# Patient Record
Sex: FEMALE | Race: BLACK OR AFRICAN AMERICAN | NOT HISPANIC OR LATINO
[De-identification: names, ages, dates, MRNs, and addresses within clinical notes are randomized per-mention and may not be internally consistent; named-entity substitution may affect disease eponyms.]

---

## 2020-03-10 PROBLEM — Z00.00 ENCOUNTER FOR PREVENTIVE HEALTH EXAMINATION: Status: ACTIVE | Noted: 2020-03-10

## 2020-05-11 ENCOUNTER — ASOB RESULT (OUTPATIENT)
Age: 30
End: 2020-05-11

## 2020-05-11 ENCOUNTER — APPOINTMENT (OUTPATIENT)
Dept: OBGYN | Facility: CLINIC | Age: 30
End: 2020-05-11
Payer: COMMERCIAL

## 2020-05-11 VITALS
TEMPERATURE: 98.2 F | BODY MASS INDEX: 35.5 KG/M2 | WEIGHT: 188 LBS | DIASTOLIC BLOOD PRESSURE: 86 MMHG | HEIGHT: 61 IN | SYSTOLIC BLOOD PRESSURE: 144 MMHG | HEART RATE: 72 BPM

## 2020-05-11 DIAGNOSIS — Z78.9 OTHER SPECIFIED HEALTH STATUS: ICD-10-CM

## 2020-05-11 DIAGNOSIS — Z86.19 PERSONAL HISTORY OF OTHER INFECTIOUS AND PARASITIC DISEASES: ICD-10-CM

## 2020-05-11 DIAGNOSIS — Z83.3 FAMILY HISTORY OF DIABETES MELLITUS: ICD-10-CM

## 2020-05-11 DIAGNOSIS — Z86.39 PERSONAL HISTORY OF OTHER ENDOCRINE, NUTRITIONAL AND METABOLIC DISEASE: ICD-10-CM

## 2020-05-11 DIAGNOSIS — Z82.49 FAMILY HISTORY OF ISCHEMIC HEART DISEASE AND OTHER DISEASES OF THE CIRCULATORY SYSTEM: ICD-10-CM

## 2020-05-11 PROCEDURE — 76817 TRANSVAGINAL US OBSTETRIC: CPT

## 2020-05-11 PROCEDURE — 99214 OFFICE O/P EST MOD 30 MIN: CPT

## 2020-05-11 PROCEDURE — 76801 OB US < 14 WKS SINGLE FETUS: CPT

## 2020-05-11 PROCEDURE — 81025 URINE PREGNANCY TEST: CPT

## 2020-05-12 LAB
BASOPHILS # BLD AUTO: 0.04 K/UL
BASOPHILS NFR BLD AUTO: 0.5 %
EOSINOPHIL # BLD AUTO: 0.26 K/UL
EOSINOPHIL NFR BLD AUTO: 3.6 %
HCG SERPL-MCNC: 7088 MIU/ML
HCG UR QL: POSITIVE
HCT VFR BLD CALC: 36.3 %
HGB BLD-MCNC: 11.9 G/DL
IMM GRANULOCYTES NFR BLD AUTO: 0.1 %
LYMPHOCYTES # BLD AUTO: 2.27 K/UL
LYMPHOCYTES NFR BLD AUTO: 31.1 %
MAN DIFF?: NORMAL
MCHC RBC-ENTMCNC: 29.2 PG
MCHC RBC-ENTMCNC: 32.8 G/DL
MCV RBC AUTO: 89 FL
MONOCYTES # BLD AUTO: 0.52 K/UL
MONOCYTES NFR BLD AUTO: 7.1 %
NEUTROPHILS # BLD AUTO: 4.2 K/UL
NEUTROPHILS NFR BLD AUTO: 57.6 %
PLATELET # BLD AUTO: 304 K/UL
QUALITY CONTROL: YES
RBC # BLD: 4.08 M/UL
RBC # FLD: 12.9 %
WBC # FLD AUTO: 7.3 K/UL

## 2020-05-13 ENCOUNTER — APPOINTMENT (OUTPATIENT)
Dept: ANTEPARTUM | Facility: CLINIC | Age: 30
End: 2020-05-13
Payer: COMMERCIAL

## 2020-05-13 ENCOUNTER — APPOINTMENT (OUTPATIENT)
Dept: OBGYN | Facility: CLINIC | Age: 30
End: 2020-05-13
Payer: COMMERCIAL

## 2020-05-13 ENCOUNTER — TRANSCRIPTION ENCOUNTER (OUTPATIENT)
Age: 30
End: 2020-05-13

## 2020-05-13 ENCOUNTER — ASOB RESULT (OUTPATIENT)
Age: 30
End: 2020-05-13

## 2020-05-13 ENCOUNTER — OUTPATIENT (OUTPATIENT)
Dept: OUTPATIENT SERVICES | Facility: HOSPITAL | Age: 30
LOS: 1 days | Discharge: HOME | End: 2020-05-13

## 2020-05-13 PROBLEM — Z83.3 FAMILY HISTORY OF DIABETES MELLITUS: Status: ACTIVE | Noted: 2020-05-11

## 2020-05-13 PROBLEM — Z78.9 DOES NOT USE ILLICIT DRUGS: Status: ACTIVE | Noted: 2020-05-13

## 2020-05-13 PROBLEM — Z86.39 HISTORY OF OBESITY: Status: RESOLVED | Noted: 2020-05-13 | Resolved: 2020-05-13

## 2020-05-13 PROBLEM — Z82.49 FAMILY HISTORY OF HYPERTENSION: Status: ACTIVE | Noted: 2020-05-11

## 2020-05-13 PROBLEM — Z78.9 CONSUMES ALCOHOL OCCASIONALLY: Status: ACTIVE | Noted: 2020-05-13

## 2020-05-13 PROBLEM — Z86.19 HISTORY OF HERPES SIMPLEX INFECTION: Status: RESOLVED | Noted: 2020-05-13 | Resolved: 2020-05-13

## 2020-05-13 PROCEDURE — 99442: CPT

## 2020-05-13 PROCEDURE — 76817 TRANSVAGINAL US OBSTETRIC: CPT | Mod: 26

## 2020-05-13 NOTE — COUNSELING
[Nutrition] : nutrition [Vitamins/Supplements] : vitamins/supplements [Pregnancy Options] : pregnancy options [Safe Sexual Practices] : safe sexual practices

## 2020-05-13 NOTE — HISTORY OF PRESENT ILLNESS
[Good] : being in good health [Regular Cycle Intervals] : periods have been regular [Menarche Age: ____] : age at menarche was [unfilled] [Sexually Active] : is sexually active [Monogamous] : is monogamous [Contraception] : uses contraception [Condoms] : uses condoms [Male ___] : [unfilled] male

## 2020-05-13 NOTE — PHYSICAL EXAM
[No Lesions] : no genitalia lesions [Normal] : uterus [Labia Majora] : labia major [No Bleeding] : there was no active vaginal bleeding [Discharge] : had no discharge [Dilated] : the cervix was not dilated [Motion Tenderness] : there was no cervical motion tenderness [Tenderness] : nontender [Enlarged ___ wks] : not enlarged

## 2020-05-15 DIAGNOSIS — Z3A.01 LESS THAN 8 WEEKS GESTATION OF PREGNANCY: ICD-10-CM

## 2020-05-15 DIAGNOSIS — N83.209 UNSPECIFIED OVARIAN CYST, UNSPECIFIED SIDE: ICD-10-CM

## 2020-05-15 DIAGNOSIS — O36.80X0 PREGNANCY WITH INCONCLUSIVE FETAL VIABILITY, NOT APPLICABLE OR UNSPECIFIED: ICD-10-CM

## 2020-05-18 LAB
BASOPHILS # BLD AUTO: 0.05 K/UL
BASOPHILS NFR BLD AUTO: 0.6 %
EOSINOPHIL # BLD AUTO: 0.28 K/UL
EOSINOPHIL NFR BLD AUTO: 3.3 %
HCG SERPL-MCNC: ABNORMAL MIU/ML
HCT VFR BLD CALC: 37.6 %
HGB BLD-MCNC: 12.2 G/DL
IMM GRANULOCYTES NFR BLD AUTO: 0.2 %
LYMPHOCYTES # BLD AUTO: 2.76 K/UL
LYMPHOCYTES NFR BLD AUTO: 32.8 %
MAN DIFF?: NORMAL
MCHC RBC-ENTMCNC: 29.1 PG
MCHC RBC-ENTMCNC: 32.4 G/DL
MCV RBC AUTO: 89.7 FL
MONOCYTES # BLD AUTO: 0.57 K/UL
MONOCYTES NFR BLD AUTO: 6.8 %
NEUTROPHILS # BLD AUTO: 4.74 K/UL
NEUTROPHILS NFR BLD AUTO: 56.3 %
PLATELET # BLD AUTO: 303 K/UL
RBC # BLD: 4.19 M/UL
RBC # FLD: 12.7 %
WBC # FLD AUTO: 8.42 K/UL

## 2020-05-22 RX ORDER — METOCLOPRAMIDE 10 MG/1
10 TABLET ORAL 3 TIMES DAILY
Qty: 90 | Refills: 1 | Status: ACTIVE | COMMUNITY
Start: 2020-05-22 | End: 1900-01-01

## 2020-06-08 ENCOUNTER — APPOINTMENT (OUTPATIENT)
Dept: OBGYN | Facility: CLINIC | Age: 30
End: 2020-06-08
Payer: COMMERCIAL

## 2020-06-08 VITALS
SYSTOLIC BLOOD PRESSURE: 118 MMHG | DIASTOLIC BLOOD PRESSURE: 78 MMHG | TEMPERATURE: 97.2 F | HEIGHT: 61 IN | WEIGHT: 194 LBS | BODY MASS INDEX: 36.63 KG/M2 | HEART RATE: 69 BPM

## 2020-06-08 DIAGNOSIS — Z33.1 PREGNANT STATE, INCIDENTAL: ICD-10-CM

## 2020-06-08 DIAGNOSIS — Z34.90 ENCOUNTER FOR SUPERVISION OF NORMAL PREGNANCY, UNSPECIFIED, UNSPECIFIED TRIMESTER: ICD-10-CM

## 2020-06-08 DIAGNOSIS — Z87.42 PERSONAL HISTORY OF OTHER DISEASES OF THE FEMALE GENITAL TRACT: ICD-10-CM

## 2020-06-08 DIAGNOSIS — Z3A.01 LESS THAN 8 WEEKS GESTATION OF PREGNANCY: ICD-10-CM

## 2020-06-08 LAB
ABO + RH PNL BLD: NORMAL
ANION GAP SERPL CALC-SCNC: 15 MMOL/L
BASOPHILS # BLD AUTO: 0.02 K/UL
BASOPHILS NFR BLD AUTO: 0.2 %
BILIRUB UR QL STRIP: NORMAL
BLD GP AB SCN SERPL QL: NORMAL
BUN SERPL-MCNC: 8 MG/DL
CALCIUM SERPL-MCNC: 9.6 MG/DL
CHLORIDE SERPL-SCNC: 99 MMOL/L
CLARITY UR: CLEAR
CO2 SERPL-SCNC: 23 MMOL/L
COLLECTION METHOD: NORMAL
CREAT SERPL-MCNC: 0.5 MG/DL
EOSINOPHIL # BLD AUTO: 0.12 K/UL
EOSINOPHIL NFR BLD AUTO: 1.4 %
ESTIMATED AVERAGE GLUCOSE: 103 MG/DL
GLUCOSE SERPL-MCNC: 77 MG/DL
GLUCOSE UR-MCNC: NORMAL
HBA1C MFR BLD HPLC: 5.2 %
HCG SERPL-MCNC: ABNORMAL MIU/ML
HCG UR QL: 0.2 EU/DL
HCT VFR BLD CALC: 37.3 %
HGB BLD-MCNC: 12 G/DL
HGB UR QL STRIP.AUTO: NORMAL
IMM GRANULOCYTES NFR BLD AUTO: 0.5 %
KETONES UR-MCNC: NORMAL
LEUKOCYTE ESTERASE UR QL STRIP: NORMAL
LYMPHOCYTES # BLD AUTO: 2.12 K/UL
LYMPHOCYTES NFR BLD AUTO: 24 %
MAN DIFF?: NORMAL
MCHC RBC-ENTMCNC: 28.4 PG
MCHC RBC-ENTMCNC: 32.2 G/DL
MCV RBC AUTO: 88.4 FL
MONOCYTES # BLD AUTO: 0.57 K/UL
MONOCYTES NFR BLD AUTO: 6.4 %
NEUTROPHILS # BLD AUTO: 5.97 K/UL
NEUTROPHILS NFR BLD AUTO: 67.5 %
NITRITE UR QL STRIP: NORMAL
PH UR STRIP: 7
PLATELET # BLD AUTO: 303 K/UL
POTASSIUM SERPL-SCNC: 4.3 MMOL/L
PROT UR STRIP-MCNC: NORMAL
RBC # BLD: 4.22 M/UL
RBC # FLD: 12.6 %
SODIUM SERPL-SCNC: 137 MMOL/L
SP GR UR STRIP: 1.02
WBC # FLD AUTO: 8.84 K/UL

## 2020-06-08 PROCEDURE — 81003 URINALYSIS AUTO W/O SCOPE: CPT | Mod: QW

## 2020-06-08 PROCEDURE — 0500F INITIAL PRENATAL CARE VISIT: CPT

## 2020-06-08 PROCEDURE — 76815 OB US LIMITED FETUS(S): CPT

## 2020-06-09 LAB
25(OH)D3 SERPL-MCNC: 35 NG/ML
CMV IGG SERPL QL: <0.2 U/ML
CMV IGG SERPL-IMP: NEGATIVE
CMV IGM SERPL QL: <8 AU/ML
CMV IGM SERPL QL: NEGATIVE
HBV SURFACE AG SER QL: NONREACTIVE
HCV AB SER QL: NONREACTIVE
HCV S/CO RATIO: 0.09 S/CO
HIV1+2 AB SPEC QL IA.RAPID: NONREACTIVE
HSV 1+2 IGG SER IA-IMP: NEGATIVE
HSV 1+2 IGG SER IA-IMP: POSITIVE
HSV1 IGG SER QL: 1.93 INDEX
HSV2 IGG SER QL: 0.27 INDEX
MEV IGG FLD QL IA: 32.4 AU/ML
MEV IGG+IGM SER-IMP: POSITIVE
MUV AB SER-ACNC: NEGATIVE
MUV IGG SER QL IA: 8 AU/ML
RUBV IGG FLD-ACNC: 2.3 INDEX
RUBV IGG SER-IMP: POSITIVE
T GONDII AB SER-IMP: NEGATIVE
T GONDII AB SER-IMP: NEGATIVE
T GONDII IGG SER QL: <3 IU/ML
T GONDII IGM SER QL: <3 AU/ML
T PALLIDUM AB SER QL IA: NEGATIVE
TSH SERPL-ACNC: 0.71 UIU/ML
VZV AB TITR SER: POSITIVE
VZV IGG SER IF-ACNC: 551.9 INDEX

## 2020-06-10 ENCOUNTER — NON-APPOINTMENT (OUTPATIENT)
Age: 30
End: 2020-06-10

## 2020-06-10 PROBLEM — Z87.42 HISTORY OF AMENORRHEA: Status: RESOLVED | Noted: 2020-05-11 | Resolved: 2020-06-10

## 2020-06-10 PROBLEM — Z33.1 INCIDENTAL PREGNANCY: Status: RESOLVED | Noted: 2020-05-11 | Resolved: 2020-06-10

## 2020-06-10 PROBLEM — Z34.90 UNPLANNED PREGNANCY, UNKNOWN IF WANTED: Status: RESOLVED | Noted: 2020-05-13 | Resolved: 2020-06-10

## 2020-06-10 LAB
HSV1 IGM SER QL: NORMAL TITER
HSV2 AB FLD-ACNC: NORMAL TITER
LEAD BLD-MCNC: <1 UG/DL
M TB IFN-G BLD-IMP: NEGATIVE
QUANTIFERON TB PLUS MITOGEN MINUS NIL: 7.65 IU/ML
QUANTIFERON TB PLUS NIL: 0.01 IU/ML
QUANTIFERON TB PLUS TB1 MINUS NIL: 0 IU/ML
QUANTIFERON TB PLUS TB2 MINUS NIL: 0 IU/ML

## 2020-06-11 LAB
HGB A MFR BLD: 97.4 %
HGB A2 MFR BLD: 2.6 %
HGB FRACT BLD-IMP: NORMAL

## 2020-06-15 LAB
A VAGINAE DNA VAG QL NAA+PROBE: NORMAL
BVAB2 DNA VAG QL NAA+PROBE: NORMAL
C KRUSEI DNA VAG QL NAA+PROBE: NEGATIVE
C TRACH RRNA SPEC QL NAA+PROBE: NEGATIVE
FMR1 GENE MUT ANL BLD/T: NORMAL
MEGA1 DNA VAG QL NAA+PROBE: NORMAL
N GONORRHOEA RRNA SPEC QL NAA+PROBE: NEGATIVE
T VAGINALIS RRNA SPEC QL NAA+PROBE: NEGATIVE

## 2020-06-18 LAB
AR GENE MUT ANL BLD/T: NEGATIVE
CFTR MUT TESTED BLD/T: NEGATIVE

## 2020-06-29 ENCOUNTER — APPOINTMENT (OUTPATIENT)
Dept: OBGYN | Facility: CLINIC | Age: 30
End: 2020-06-29
Payer: COMMERCIAL

## 2020-06-29 ENCOUNTER — OUTPATIENT (OUTPATIENT)
Dept: OUTPATIENT SERVICES | Facility: HOSPITAL | Age: 30
LOS: 1 days | Discharge: HOME | End: 2020-06-29

## 2020-06-29 ENCOUNTER — NON-APPOINTMENT (OUTPATIENT)
Age: 30
End: 2020-06-29

## 2020-06-29 ENCOUNTER — APPOINTMENT (OUTPATIENT)
Dept: ANTEPARTUM | Facility: CLINIC | Age: 30
End: 2020-06-29
Payer: COMMERCIAL

## 2020-06-29 ENCOUNTER — ASOB RESULT (OUTPATIENT)
Age: 30
End: 2020-06-29

## 2020-06-29 VITALS
DIASTOLIC BLOOD PRESSURE: 82 MMHG | HEIGHT: 61 IN | TEMPERATURE: 97.4 F | WEIGHT: 195 LBS | BODY MASS INDEX: 36.82 KG/M2 | SYSTOLIC BLOOD PRESSURE: 117 MMHG | HEART RATE: 76 BPM

## 2020-06-29 DIAGNOSIS — Z3A.09 9 WEEKS GESTATION OF PREGNANCY: ICD-10-CM

## 2020-06-29 LAB
BILIRUB UR QL STRIP: NEGATIVE
CLARITY UR: CLEAR
COLLECTION METHOD: NORMAL
GLUCOSE UR-MCNC: NEGATIVE
HCG UR QL: 0.2 EU/DL
HGB UR QL STRIP.AUTO: NEGATIVE
KETONES UR-MCNC: NEGATIVE
LEUKOCYTE ESTERASE UR QL STRIP: NEGATIVE
NITRITE UR QL STRIP: NEGATIVE
PH UR STRIP: 5.5
PROT UR STRIP-MCNC: NEGATIVE
SP GR UR STRIP: 1.02

## 2020-06-29 PROCEDURE — 81003 URINALYSIS AUTO W/O SCOPE: CPT | Mod: QW

## 2020-06-29 PROCEDURE — 0502F SUBSEQUENT PRENATAL CARE: CPT

## 2020-06-29 PROCEDURE — 76813 OB US NUCHAL MEAS 1 GEST: CPT | Mod: 26

## 2020-06-29 RX ORDER — ACYCLOVIR 50 MG/G
5 OINTMENT TOPICAL
Qty: 30 | Refills: 0 | Status: ACTIVE | COMMUNITY
Start: 2020-01-21

## 2020-06-29 RX ORDER — DOXYCYCLINE HYCLATE 100 MG/1
100 TABLET ORAL
Qty: 8 | Refills: 0 | Status: DISCONTINUED | COMMUNITY
Start: 2020-02-22

## 2020-06-29 RX ORDER — METHYLERGONOVINE MALEATE 0.2 MG/1
0.2 TABLET ORAL
Qty: 6 | Refills: 0 | Status: COMPLETED | COMMUNITY
Start: 2020-02-22

## 2020-06-29 RX ORDER — VALACYCLOVIR 500 MG/1
500 TABLET, FILM COATED ORAL
Qty: 10 | Refills: 0 | Status: ACTIVE | COMMUNITY
Start: 2019-03-19

## 2020-08-03 ENCOUNTER — APPOINTMENT (OUTPATIENT)
Dept: OBGYN | Facility: CLINIC | Age: 30
End: 2020-08-03
Payer: COMMERCIAL

## 2020-08-03 ENCOUNTER — NON-APPOINTMENT (OUTPATIENT)
Age: 30
End: 2020-08-03

## 2020-08-03 VITALS
SYSTOLIC BLOOD PRESSURE: 109 MMHG | HEIGHT: 61 IN | BODY MASS INDEX: 38.71 KG/M2 | DIASTOLIC BLOOD PRESSURE: 65 MMHG | TEMPERATURE: 97.7 F | HEART RATE: 87 BPM | WEIGHT: 205 LBS

## 2020-08-03 DIAGNOSIS — Z3A.12 12 WEEKS GESTATION OF PREGNANCY: ICD-10-CM

## 2020-08-03 DIAGNOSIS — O21.9 VOMITING OF PREGNANCY, UNSPECIFIED: ICD-10-CM

## 2020-08-03 LAB
BILIRUB UR QL STRIP: NORMAL
CLARITY UR: CLEAR
COLLECTION METHOD: NORMAL
GLUCOSE UR-MCNC: NORMAL
HCG UR QL: 0.2 EU/DL
HGB UR QL STRIP.AUTO: NORMAL
KETONES UR-MCNC: NORMAL
LEUKOCYTE ESTERASE UR QL STRIP: NORMAL
NITRITE UR QL STRIP: NORMAL
PH UR STRIP: 7
PROT UR STRIP-MCNC: NORMAL
SP GR UR STRIP: 1.01

## 2020-08-03 PROCEDURE — 0502F SUBSEQUENT PRENATAL CARE: CPT

## 2020-08-03 PROCEDURE — 81003 URINALYSIS AUTO W/O SCOPE: CPT | Mod: QW

## 2020-08-09 LAB
2ND TRIMESTER DATA: NORMAL
AFP PNL SERPL: NORMAL
AFP SERPL-ACNC: NORMAL
CLINICAL BIOCHEMIST REVIEW: NORMAL
NOTES NTD: NORMAL

## 2020-08-28 ENCOUNTER — ASOB RESULT (OUTPATIENT)
Age: 30
End: 2020-08-28

## 2020-08-28 ENCOUNTER — APPOINTMENT (OUTPATIENT)
Dept: OBGYN | Facility: CLINIC | Age: 30
End: 2020-08-28
Payer: COMMERCIAL

## 2020-08-28 ENCOUNTER — NON-APPOINTMENT (OUTPATIENT)
Age: 30
End: 2020-08-28

## 2020-08-28 ENCOUNTER — APPOINTMENT (OUTPATIENT)
Dept: ANTEPARTUM | Facility: CLINIC | Age: 30
End: 2020-08-28
Payer: COMMERCIAL

## 2020-08-28 ENCOUNTER — OUTPATIENT (OUTPATIENT)
Dept: OUTPATIENT SERVICES | Facility: HOSPITAL | Age: 30
LOS: 1 days | Discharge: HOME | End: 2020-08-28

## 2020-08-28 VITALS
HEIGHT: 61 IN | SYSTOLIC BLOOD PRESSURE: 121 MMHG | WEIGHT: 211 LBS | BODY MASS INDEX: 39.84 KG/M2 | TEMPERATURE: 97.3 F | HEART RATE: 89 BPM | DIASTOLIC BLOOD PRESSURE: 84 MMHG

## 2020-08-28 DIAGNOSIS — Z87.59 PERSONAL HISTORY OF OTHER COMPLICATIONS OF PREGNANCY, CHILDBIRTH AND THE PUERPERIUM: ICD-10-CM

## 2020-08-28 DIAGNOSIS — E66.9 OBESITY, UNSPECIFIED: ICD-10-CM

## 2020-08-28 DIAGNOSIS — Z31.5 ENCOUNTER FOR PROCREATIVE GENETIC COUNSELING: ICD-10-CM

## 2020-08-28 DIAGNOSIS — Z3A.17 17 WEEKS GESTATION OF PREGNANCY: ICD-10-CM

## 2020-08-28 LAB
BILIRUB UR QL STRIP: NORMAL
CLARITY UR: CLEAR
COLLECTION METHOD: NORMAL
GLUCOSE UR-MCNC: NORMAL
HCG UR QL: 0.2 EU/DL
HGB UR QL STRIP.AUTO: NORMAL
KETONES UR-MCNC: NORMAL
LEUKOCYTE ESTERASE UR QL STRIP: ABNORMAL
NITRITE UR QL STRIP: NORMAL
PH UR STRIP: 7
PROT UR STRIP-MCNC: NORMAL
SP GR UR STRIP: 1.02

## 2020-08-28 PROCEDURE — 76817 TRANSVAGINAL US OBSTETRIC: CPT | Mod: 26,59

## 2020-08-28 PROCEDURE — 81003 URINALYSIS AUTO W/O SCOPE: CPT | Mod: QW

## 2020-08-28 PROCEDURE — 0502F SUBSEQUENT PRENATAL CARE: CPT

## 2020-08-28 PROCEDURE — 76805 OB US >/= 14 WKS SNGL FETUS: CPT | Mod: 26

## 2020-08-28 RX ORDER — TERCONAZOLE 8 MG/G
0.8 CREAM VAGINAL
Qty: 1 | Refills: 1 | Status: ACTIVE | COMMUNITY
Start: 2020-08-28 | End: 1900-01-01

## 2020-08-28 RX ORDER — VALACYCLOVIR 500 MG/1
500 TABLET, FILM COATED ORAL
Qty: 14 | Refills: 5 | Status: ACTIVE | COMMUNITY
Start: 2020-08-28 | End: 1900-01-01

## 2020-08-31 DIAGNOSIS — O35.9XX0 MATERNAL CARE FOR (SUSPECTED) FETAL ABNORMALITY AND DAMAGE, UNSPECIFIED, NOT APPLICABLE OR UNSPECIFIED: ICD-10-CM

## 2020-08-31 DIAGNOSIS — Z3A.20 20 WEEKS GESTATION OF PREGNANCY: ICD-10-CM

## 2020-08-31 DIAGNOSIS — Z36.3 ENCOUNTER FOR ANTENATAL SCREENING FOR MALFORMATIONS: ICD-10-CM

## 2020-08-31 DIAGNOSIS — Z36.89 ENCOUNTER FOR OTHER SPECIFIED ANTENATAL SCREENING: ICD-10-CM

## 2020-08-31 DIAGNOSIS — O43.199 OTHER MALFORMATION OF PLACENTA, UNSPECIFIED TRIMESTER: ICD-10-CM

## 2020-08-31 DIAGNOSIS — O99.210 OBESITY COMPLICATING PREGNANCY, UNSPECIFIED TRIMESTER: ICD-10-CM

## 2020-09-25 ENCOUNTER — NON-APPOINTMENT (OUTPATIENT)
Age: 30
End: 2020-09-25

## 2020-09-25 ENCOUNTER — APPOINTMENT (OUTPATIENT)
Dept: OBGYN | Facility: CLINIC | Age: 30
End: 2020-09-25
Payer: COMMERCIAL

## 2020-09-25 VITALS
BODY MASS INDEX: 39.84 KG/M2 | HEIGHT: 61 IN | HEART RATE: 91 BPM | SYSTOLIC BLOOD PRESSURE: 125 MMHG | TEMPERATURE: 97 F | WEIGHT: 211 LBS | DIASTOLIC BLOOD PRESSURE: 82 MMHG

## 2020-09-25 DIAGNOSIS — Z3A.20 20 WEEKS GESTATION OF PREGNANCY: ICD-10-CM

## 2020-09-25 DIAGNOSIS — Z86.19 PERSONAL HISTORY OF OTHER INFECTIOUS AND PARASITIC DISEASES: ICD-10-CM

## 2020-09-25 PROCEDURE — 90686 IIV4 VACC NO PRSV 0.5 ML IM: CPT

## 2020-09-25 PROCEDURE — 81003 URINALYSIS AUTO W/O SCOPE: CPT | Mod: QW

## 2020-09-25 PROCEDURE — G0008: CPT

## 2020-09-25 PROCEDURE — 0502F SUBSEQUENT PRENATAL CARE: CPT

## 2020-09-28 LAB
BASOPHILS # BLD AUTO: 0.03 K/UL
BASOPHILS NFR BLD AUTO: 0.3 %
EOSINOPHIL # BLD AUTO: 0.13 K/UL
EOSINOPHIL NFR BLD AUTO: 1.4 %
GLUCOSE 1H P 50 G GLC PO SERPL-MCNC: 161 MG/DL
HCT VFR BLD CALC: 30.4 %
HGB BLD-MCNC: 9.9 G/DL
IMM GRANULOCYTES NFR BLD AUTO: 1.1 %
LYMPHOCYTES # BLD AUTO: 1.57 K/UL
LYMPHOCYTES NFR BLD AUTO: 17 %
MAN DIFF?: NORMAL
MCHC RBC-ENTMCNC: 28.9 PG
MCHC RBC-ENTMCNC: 32.6 G/DL
MCV RBC AUTO: 88.9 FL
MONOCYTES # BLD AUTO: 0.42 K/UL
MONOCYTES NFR BLD AUTO: 4.6 %
NEUTROPHILS # BLD AUTO: 6.98 K/UL
NEUTROPHILS NFR BLD AUTO: 75.6 %
PLATELET # BLD AUTO: 229 K/UL
RBC # BLD: 3.42 M/UL
RBC # FLD: 13.2 %
WBC # FLD AUTO: 9.23 K/UL

## 2020-09-28 RX ORDER — CHLORHEXIDINE GLUCONATE 4 %
325 (65 FE) LIQUID (ML) TOPICAL 3 TIMES DAILY
Qty: 90 | Refills: 1 | Status: ACTIVE | COMMUNITY
Start: 2020-09-28 | End: 1900-01-01

## 2020-10-05 LAB
GLUCOSE 1H P 100 G GLC PO SERPL-MCNC: 174 MG/DL
GLUCOSE 2H P CHAL SERPL-MCNC: 158 MG/DL
GLUCOSE 3H P CHAL SERPL-MCNC: 110 MG/DL
GLUCOSE BS SERPL-MCNC: 78 MG/DL

## 2020-10-23 ENCOUNTER — APPOINTMENT (OUTPATIENT)
Dept: OBGYN | Facility: CLINIC | Age: 30
End: 2020-10-23

## 2020-10-30 ENCOUNTER — APPOINTMENT (OUTPATIENT)
Dept: OBGYN | Facility: CLINIC | Age: 30
End: 2020-10-30
Payer: COMMERCIAL

## 2020-10-30 VITALS
TEMPERATURE: 97.2 F | WEIGHT: 219 LBS | HEIGHT: 61 IN | SYSTOLIC BLOOD PRESSURE: 121 MMHG | DIASTOLIC BLOOD PRESSURE: 75 MMHG | BODY MASS INDEX: 41.35 KG/M2 | HEART RATE: 89 BPM

## 2020-10-30 DIAGNOSIS — Z3A.24 24 WEEKS GESTATION OF PREGNANCY: ICD-10-CM

## 2020-10-30 DIAGNOSIS — O99.810 ABNORMAL GLUCOSE COMPLICATING PREGNANCY: ICD-10-CM

## 2020-10-30 PROCEDURE — 0502F SUBSEQUENT PRENATAL CARE: CPT

## 2020-10-30 PROCEDURE — 81003 URINALYSIS AUTO W/O SCOPE: CPT | Mod: QW

## 2020-10-30 PROCEDURE — 90471 IMMUNIZATION ADMIN: CPT

## 2020-10-30 PROCEDURE — 90715 TDAP VACCINE 7 YRS/> IM: CPT

## 2020-10-30 RX ORDER — METRONIDAZOLE 7.5 MG/G
0.75 GEL VAGINAL
Qty: 1 | Refills: 1 | Status: ACTIVE | COMMUNITY
Start: 2020-10-30 | End: 1900-01-01

## 2020-10-31 LAB
BILIRUB UR QL STRIP: NORMAL
CLARITY UR: CLEAR
COLLECTION METHOD: NORMAL
GLUCOSE UR-MCNC: NORMAL
HCG UR QL: 0.2 EU/DL
HGB UR QL STRIP.AUTO: NORMAL
KETONES UR-MCNC: NORMAL
LEUKOCYTE ESTERASE UR QL STRIP: ABNORMAL
NITRITE UR QL STRIP: NORMAL
PH UR STRIP: 7
PROT UR STRIP-MCNC: NORMAL
SP GR UR STRIP: 1.01

## 2020-11-04 LAB
A VAGINAE DNA VAG QL NAA+PROBE: NORMAL
BVAB2 DNA VAG QL NAA+PROBE: NORMAL
C KRUSEI DNA VAG QL NAA+PROBE: NEGATIVE
C TRACH RRNA SPEC QL NAA+PROBE: NEGATIVE
MEGA1 DNA VAG QL NAA+PROBE: ABNORMAL
N GONORRHOEA RRNA SPEC QL NAA+PROBE: NEGATIVE
T VAGINALIS RRNA SPEC QL NAA+PROBE: POSITIVE

## 2020-11-04 RX ORDER — METRONIDAZOLE 500 MG/1
500 TABLET ORAL
Qty: 4 | Refills: 0 | Status: ACTIVE | COMMUNITY
Start: 2020-11-04 | End: 1900-01-01

## 2020-11-08 ENCOUNTER — NON-APPOINTMENT (OUTPATIENT)
Age: 30
End: 2020-11-08

## 2020-11-08 PROBLEM — O99.810 PREGNANCY WITH ABNORMAL GLUCOSE TOLERANCE TEST (GTT): Status: RESOLVED | Noted: 2020-09-28 | Resolved: 2020-11-08

## 2020-11-08 PROBLEM — Z3A.24 PREGNANCY WITH 24 COMPLETED WEEKS GESTATION: Status: RESOLVED | Noted: 2020-09-25 | Resolved: 2020-11-08

## 2020-11-09 ENCOUNTER — ASOB RESULT (OUTPATIENT)
Age: 30
End: 2020-11-09

## 2020-11-09 ENCOUNTER — APPOINTMENT (OUTPATIENT)
Dept: ANTEPARTUM | Facility: CLINIC | Age: 30
End: 2020-11-09
Payer: COMMERCIAL

## 2020-11-09 ENCOUNTER — RESULT CHARGE (OUTPATIENT)
Age: 30
End: 2020-11-09

## 2020-11-09 ENCOUNTER — NON-APPOINTMENT (OUTPATIENT)
Age: 30
End: 2020-11-09

## 2020-11-09 ENCOUNTER — APPOINTMENT (OUTPATIENT)
Dept: OBGYN | Facility: CLINIC | Age: 30
End: 2020-11-09
Payer: COMMERCIAL

## 2020-11-09 ENCOUNTER — OUTPATIENT (OUTPATIENT)
Dept: OUTPATIENT SERVICES | Facility: HOSPITAL | Age: 30
LOS: 1 days | Discharge: HOME | End: 2020-11-09

## 2020-11-09 VITALS
HEIGHT: 61 IN | SYSTOLIC BLOOD PRESSURE: 121 MMHG | WEIGHT: 216 LBS | BODY MASS INDEX: 40.78 KG/M2 | HEART RATE: 91 BPM | DIASTOLIC BLOOD PRESSURE: 75 MMHG | TEMPERATURE: 97.5 F

## 2020-11-09 DIAGNOSIS — Z3A.29 29 WEEKS GESTATION OF PREGNANCY: ICD-10-CM

## 2020-11-09 LAB
BILIRUB UR QL STRIP: NEGATIVE
CLARITY UR: NORMAL
COLLECTION METHOD: NORMAL
GLUCOSE UR-MCNC: ABNORMAL
HCG UR QL: 0.2 EU/DL
HGB UR QL STRIP.AUTO: NEGATIVE
KETONES UR-MCNC: NEGATIVE
LEUKOCYTE ESTERASE UR QL STRIP: NORMAL
NITRITE UR QL STRIP: NEGATIVE
PH UR STRIP: 6
PROT UR STRIP-MCNC: NEGATIVE
SP GR UR STRIP: 1.02

## 2020-11-09 PROCEDURE — 81003 URINALYSIS AUTO W/O SCOPE: CPT | Mod: QW

## 2020-11-09 PROCEDURE — 0502F SUBSEQUENT PRENATAL CARE: CPT

## 2020-11-09 PROCEDURE — 76816 OB US FOLLOW-UP PER FETUS: CPT | Mod: 26

## 2020-11-10 DIAGNOSIS — O99.213 OBESITY COMPLICATING PREGNANCY, THIRD TRIMESTER: ICD-10-CM

## 2020-11-10 DIAGNOSIS — Z3A.31 31 WEEKS GESTATION OF PREGNANCY: ICD-10-CM

## 2020-11-17 ENCOUNTER — RX RENEWAL (OUTPATIENT)
Age: 30
End: 2020-11-17

## 2020-11-17 RX ORDER — ASCORBIC ACID 500 MG
500 TABLET ORAL
Qty: 60 | Refills: 0 | Status: ACTIVE | COMMUNITY
Start: 2020-09-28 | End: 1900-01-01

## 2020-11-23 ENCOUNTER — APPOINTMENT (OUTPATIENT)
Dept: OBGYN | Facility: CLINIC | Age: 30
End: 2020-11-23
Payer: COMMERCIAL

## 2020-11-23 ENCOUNTER — OUTPATIENT (OUTPATIENT)
Dept: OUTPATIENT SERVICES | Facility: HOSPITAL | Age: 30
LOS: 1 days | Discharge: HOME | End: 2020-11-23

## 2020-11-23 ENCOUNTER — NON-APPOINTMENT (OUTPATIENT)
Age: 30
End: 2020-11-23

## 2020-11-23 ENCOUNTER — APPOINTMENT (OUTPATIENT)
Dept: ANTEPARTUM | Facility: CLINIC | Age: 30
End: 2020-11-23
Payer: COMMERCIAL

## 2020-11-23 ENCOUNTER — ASOB RESULT (OUTPATIENT)
Age: 30
End: 2020-11-23

## 2020-11-23 VITALS
HEART RATE: 80 BPM | SYSTOLIC BLOOD PRESSURE: 118 MMHG | TEMPERATURE: 98 F | HEIGHT: 61 IN | WEIGHT: 215 LBS | BODY MASS INDEX: 40.59 KG/M2 | DIASTOLIC BLOOD PRESSURE: 69 MMHG

## 2020-11-23 DIAGNOSIS — Z3A.31 31 WEEKS GESTATION OF PREGNANCY: ICD-10-CM

## 2020-11-23 LAB
BILIRUB UR QL STRIP: NEGATIVE
CLARITY UR: CLEAR
COLLECTION METHOD: NORMAL
GLUCOSE UR-MCNC: NEGATIVE
HCG UR QL: 0.2 EU/DL
HGB UR QL STRIP.AUTO: NEGATIVE
KETONES UR-MCNC: ABNORMAL
LEUKOCYTE ESTERASE UR QL STRIP: NEGATIVE
NITRITE UR QL STRIP: NEGATIVE
PH UR STRIP: 5.5
PROT UR STRIP-MCNC: NEGATIVE
SP GR UR STRIP: 1

## 2020-11-23 PROCEDURE — 81003 URINALYSIS AUTO W/O SCOPE: CPT | Mod: QW

## 2020-11-23 PROCEDURE — 0502F SUBSEQUENT PRENATAL CARE: CPT

## 2020-11-23 PROCEDURE — 76819 FETAL BIOPHYS PROFIL W/O NST: CPT | Mod: 26

## 2020-11-27 ENCOUNTER — APPOINTMENT (OUTPATIENT)
Dept: ANTEPARTUM | Facility: CLINIC | Age: 30
End: 2020-11-27

## 2020-12-04 ENCOUNTER — APPOINTMENT (OUTPATIENT)
Dept: OBGYN | Facility: CLINIC | Age: 30
End: 2020-12-04
Payer: COMMERCIAL

## 2020-12-04 ENCOUNTER — APPOINTMENT (OUTPATIENT)
Dept: ANTEPARTUM | Facility: CLINIC | Age: 30
End: 2020-12-04
Payer: COMMERCIAL

## 2020-12-04 ENCOUNTER — OUTPATIENT (OUTPATIENT)
Dept: OUTPATIENT SERVICES | Facility: HOSPITAL | Age: 30
LOS: 1 days | Discharge: HOME | End: 2020-12-04

## 2020-12-04 ENCOUNTER — NON-APPOINTMENT (OUTPATIENT)
Age: 30
End: 2020-12-04

## 2020-12-04 ENCOUNTER — ASOB RESULT (OUTPATIENT)
Age: 30
End: 2020-12-04

## 2020-12-04 VITALS
HEIGHT: 61 IN | TEMPERATURE: 96.7 F | HEART RATE: 87 BPM | BODY MASS INDEX: 40.78 KG/M2 | WEIGHT: 216 LBS | SYSTOLIC BLOOD PRESSURE: 124 MMHG | DIASTOLIC BLOOD PRESSURE: 68 MMHG

## 2020-12-04 DIAGNOSIS — Z3A.33 33 WEEKS GESTATION OF PREGNANCY: ICD-10-CM

## 2020-12-04 LAB
BILIRUB UR QL STRIP: NEGATIVE
CLARITY UR: CLEAR
COLLECTION METHOD: NORMAL
GLUCOSE UR-MCNC: 100
HCG UR QL: 0.2 EU/DL
HGB UR QL STRIP.AUTO: NEGATIVE
KETONES UR-MCNC: NEGATIVE
LEUKOCYTE ESTERASE UR QL STRIP: ABNORMAL
NITRITE UR QL STRIP: NEGATIVE
PH UR STRIP: 7
PROT UR STRIP-MCNC: NEGATIVE
SP GR UR STRIP: 1.01

## 2020-12-04 PROCEDURE — 81003 URINALYSIS AUTO W/O SCOPE: CPT | Mod: QW

## 2020-12-04 PROCEDURE — 76819 FETAL BIOPHYS PROFIL W/O NST: CPT | Mod: 26

## 2020-12-04 PROCEDURE — 0502F SUBSEQUENT PRENATAL CARE: CPT

## 2020-12-08 LAB — BACTERIA UR CULT: ABNORMAL

## 2020-12-08 RX ORDER — CEPHALEXIN 500 MG/1
500 CAPSULE ORAL 4 TIMES DAILY
Qty: 28 | Refills: 0 | Status: ACTIVE | COMMUNITY
Start: 2020-12-08 | End: 1900-01-01

## 2020-12-11 ENCOUNTER — APPOINTMENT (OUTPATIENT)
Dept: ANTEPARTUM | Facility: CLINIC | Age: 30
End: 2020-12-11
Payer: COMMERCIAL

## 2020-12-11 ENCOUNTER — NON-APPOINTMENT (OUTPATIENT)
Age: 30
End: 2020-12-11

## 2020-12-11 ENCOUNTER — OUTPATIENT (OUTPATIENT)
Dept: OUTPATIENT SERVICES | Facility: HOSPITAL | Age: 30
LOS: 1 days | Discharge: HOME | End: 2020-12-11

## 2020-12-11 ENCOUNTER — ASOB RESULT (OUTPATIENT)
Age: 30
End: 2020-12-11

## 2020-12-11 ENCOUNTER — APPOINTMENT (OUTPATIENT)
Dept: OBGYN | Facility: CLINIC | Age: 30
End: 2020-12-11
Payer: COMMERCIAL

## 2020-12-11 VITALS
BODY MASS INDEX: 40.59 KG/M2 | SYSTOLIC BLOOD PRESSURE: 111 MMHG | TEMPERATURE: 97.2 F | WEIGHT: 215 LBS | HEART RATE: 87 BPM | DIASTOLIC BLOOD PRESSURE: 73 MMHG | HEIGHT: 61 IN

## 2020-12-11 DIAGNOSIS — Z3A.34 34 WEEKS GESTATION OF PREGNANCY: ICD-10-CM

## 2020-12-11 LAB
BASOPHILS # BLD AUTO: 0.03 K/UL
BASOPHILS NFR BLD AUTO: 0.4 %
BILIRUB UR QL STRIP: NORMAL
CLARITY UR: CLEAR
COLLECTION METHOD: NORMAL
EOSINOPHIL # BLD AUTO: 0.09 K/UL
EOSINOPHIL NFR BLD AUTO: 1.1 %
GLUCOSE UR-MCNC: NORMAL
HCG UR QL: 0.2 EU/DL
HCT VFR BLD CALC: 32.8 %
HGB BLD-MCNC: 10.7 G/DL
HGB UR QL STRIP.AUTO: NORMAL
IMM GRANULOCYTES NFR BLD AUTO: 1.1 %
KETONES UR-MCNC: NORMAL
LEUKOCYTE ESTERASE UR QL STRIP: NORMAL
LYMPHOCYTES # BLD AUTO: 1.42 K/UL
LYMPHOCYTES NFR BLD AUTO: 17.1 %
MAN DIFF?: NORMAL
MCHC RBC-ENTMCNC: 28.2 PG
MCHC RBC-ENTMCNC: 32.6 G/DL
MCV RBC AUTO: 86.3 FL
MONOCYTES # BLD AUTO: 0.58 K/UL
MONOCYTES NFR BLD AUTO: 7 %
NEUTROPHILS # BLD AUTO: 6.1 K/UL
NEUTROPHILS NFR BLD AUTO: 73.3 %
NITRITE UR QL STRIP: NORMAL
PH UR STRIP: 7
PLATELET # BLD AUTO: 226 K/UL
PROT UR STRIP-MCNC: NORMAL
RBC # BLD: 3.8 M/UL
RBC # FLD: 13.5 %
SP GR UR STRIP: 1.02
WBC # FLD AUTO: 8.31 K/UL

## 2020-12-11 PROCEDURE — 0502F SUBSEQUENT PRENATAL CARE: CPT

## 2020-12-11 PROCEDURE — 81003 URINALYSIS AUTO W/O SCOPE: CPT | Mod: QW

## 2020-12-11 PROCEDURE — 76816 OB US FOLLOW-UP PER FETUS: CPT | Mod: 26

## 2020-12-11 PROCEDURE — 76819 FETAL BIOPHYS PROFIL W/O NST: CPT | Mod: 26

## 2020-12-11 RX ORDER — VALACYCLOVIR 500 MG/1
500 TABLET, FILM COATED ORAL TWICE DAILY
Qty: 60 | Refills: 0 | Status: ACTIVE | COMMUNITY
Start: 2020-12-11 | End: 1900-01-01

## 2020-12-12 LAB
HIV1+2 AB SPEC QL IA.RAPID: NONREACTIVE
T PALLIDUM AB SER QL IA: NEGATIVE

## 2020-12-14 LAB — B-HEM STREP SPEC QL CULT: NORMAL

## 2020-12-17 DIAGNOSIS — O99.210 OBESITY COMPLICATING PREGNANCY, UNSPECIFIED TRIMESTER: ICD-10-CM

## 2020-12-17 DIAGNOSIS — Z3A.34 34 WEEKS GESTATION OF PREGNANCY: ICD-10-CM

## 2020-12-17 DIAGNOSIS — O43.199 OTHER MALFORMATION OF PLACENTA, UNSPECIFIED TRIMESTER: ICD-10-CM

## 2020-12-18 ENCOUNTER — APPOINTMENT (OUTPATIENT)
Dept: ANTEPARTUM | Facility: CLINIC | Age: 30
End: 2020-12-18

## 2020-12-18 DIAGNOSIS — O99.210 OBESITY COMPLICATING PREGNANCY, UNSPECIFIED TRIMESTER: ICD-10-CM

## 2020-12-18 DIAGNOSIS — Z3A.35 35 WEEKS GESTATION OF PREGNANCY: ICD-10-CM

## 2020-12-18 DIAGNOSIS — O26.849 UTERINE SIZE-DATE DISCREPANCY, UNSPECIFIED TRIMESTER: ICD-10-CM

## 2020-12-21 ENCOUNTER — APPOINTMENT (OUTPATIENT)
Dept: OBGYN | Facility: CLINIC | Age: 30
End: 2020-12-21
Payer: COMMERCIAL

## 2020-12-21 ENCOUNTER — NON-APPOINTMENT (OUTPATIENT)
Age: 30
End: 2020-12-21

## 2020-12-21 VITALS
HEIGHT: 61 IN | HEART RATE: 75 BPM | SYSTOLIC BLOOD PRESSURE: 112 MMHG | TEMPERATURE: 98.4 F | WEIGHT: 217 LBS | BODY MASS INDEX: 40.97 KG/M2 | DIASTOLIC BLOOD PRESSURE: 65 MMHG

## 2020-12-21 DIAGNOSIS — Z86.19 PERSONAL HISTORY OF OTHER INFECTIOUS AND PARASITIC DISEASES: ICD-10-CM

## 2020-12-21 DIAGNOSIS — Z3A.35 35 WEEKS GESTATION OF PREGNANCY: ICD-10-CM

## 2020-12-21 LAB
BILIRUB UR QL STRIP: NEGATIVE
CLARITY UR: CLEAR
COLLECTION METHOD: NORMAL
GLUCOSE UR-MCNC: NEGATIVE
HCG UR QL: 0.2 EU/DL
HGB UR QL STRIP.AUTO: NEGATIVE
KETONES UR-MCNC: NEGATIVE
LEUKOCYTE ESTERASE UR QL STRIP: ABNORMAL
NITRITE UR QL STRIP: NEGATIVE
PH UR STRIP: 5.5
PROT UR STRIP-MCNC: NEGATIVE
SP GR UR STRIP: 1

## 2020-12-21 PROCEDURE — 0502F SUBSEQUENT PRENATAL CARE: CPT

## 2020-12-21 PROCEDURE — 81003 URINALYSIS AUTO W/O SCOPE: CPT | Mod: QW

## 2020-12-24 ENCOUNTER — APPOINTMENT (OUTPATIENT)
Dept: ANTEPARTUM | Facility: CLINIC | Age: 30
End: 2020-12-24

## 2020-12-28 ENCOUNTER — NON-APPOINTMENT (OUTPATIENT)
Age: 30
End: 2020-12-28

## 2020-12-28 ENCOUNTER — APPOINTMENT (OUTPATIENT)
Dept: OBGYN | Facility: CLINIC | Age: 30
End: 2020-12-28
Payer: COMMERCIAL

## 2020-12-28 ENCOUNTER — ASOB RESULT (OUTPATIENT)
Age: 30
End: 2020-12-28

## 2020-12-28 ENCOUNTER — RESULT CHARGE (OUTPATIENT)
Age: 30
End: 2020-12-28

## 2020-12-28 VITALS
BODY MASS INDEX: 40.97 KG/M2 | DIASTOLIC BLOOD PRESSURE: 75 MMHG | WEIGHT: 217 LBS | HEIGHT: 61 IN | HEART RATE: 85 BPM | TEMPERATURE: 97.9 F | SYSTOLIC BLOOD PRESSURE: 126 MMHG

## 2020-12-28 DIAGNOSIS — Z3A.37 37 WEEKS GESTATION OF PREGNANCY: ICD-10-CM

## 2020-12-28 LAB
BACTERIA UR CULT: ABNORMAL
BILIRUB UR QL STRIP: NEGATIVE
CLARITY UR: CLEAR
COLLECTION METHOD: NORMAL
GLUCOSE UR-MCNC: NEGATIVE
HCG UR QL: 0.2 EU/DL
HGB UR QL STRIP.AUTO: NEGATIVE
KETONES UR-MCNC: NEGATIVE
LEUKOCYTE ESTERASE UR QL STRIP: ABNORMAL
NITRITE UR QL STRIP: NEGATIVE
PH UR STRIP: 5.5
PROT UR STRIP-MCNC: NEGATIVE
SP GR UR STRIP: 1

## 2020-12-28 PROCEDURE — 0502F SUBSEQUENT PRENATAL CARE: CPT

## 2020-12-28 PROCEDURE — 76805 OB US >/= 14 WKS SNGL FETUS: CPT

## 2020-12-28 PROCEDURE — 81003 URINALYSIS AUTO W/O SCOPE: CPT | Mod: QW

## 2020-12-28 PROCEDURE — 99072 ADDL SUPL MATRL&STAF TM PHE: CPT

## 2020-12-28 RX ORDER — NITROFURANTOIN (MONOHYDRATE/MACROCRYSTALS) 25; 75 MG/1; MG/1
100 CAPSULE ORAL TWICE DAILY
Qty: 14 | Refills: 0 | Status: ACTIVE | COMMUNITY
Start: 2020-12-28 | End: 1900-01-01

## 2020-12-31 ENCOUNTER — OUTPATIENT (OUTPATIENT)
Dept: OUTPATIENT SERVICES | Facility: HOSPITAL | Age: 30
LOS: 1 days | Discharge: HOME | End: 2020-12-31

## 2020-12-31 ENCOUNTER — ASOB RESULT (OUTPATIENT)
Age: 30
End: 2020-12-31

## 2020-12-31 ENCOUNTER — APPOINTMENT (OUTPATIENT)
Dept: ANTEPARTUM | Facility: CLINIC | Age: 30
End: 2020-12-31
Payer: COMMERCIAL

## 2020-12-31 DIAGNOSIS — O09.213 SUPERVISION OF PREGNANCY WITH HISTORY OF PRE-TERM LABOR, THIRD TRIMESTER: ICD-10-CM

## 2020-12-31 DIAGNOSIS — Z3A.38 38 WEEKS GESTATION OF PREGNANCY: ICD-10-CM

## 2020-12-31 PROCEDURE — 76819 FETAL BIOPHYS PROFIL W/O NST: CPT | Mod: 26

## 2021-01-01 ENCOUNTER — OUTPATIENT (OUTPATIENT)
Dept: OUTPATIENT SERVICES | Facility: HOSPITAL | Age: 31
LOS: 1 days | Discharge: HOME | End: 2021-01-01

## 2021-01-01 ENCOUNTER — LABORATORY RESULT (OUTPATIENT)
Age: 31
End: 2021-01-01

## 2021-01-01 DIAGNOSIS — Z11.59 ENCOUNTER FOR SCREENING FOR OTHER VIRAL DISEASES: ICD-10-CM

## 2021-01-03 ENCOUNTER — INPATIENT (INPATIENT)
Facility: HOSPITAL | Age: 31
LOS: 1 days | Discharge: HOME | End: 2021-01-05
Attending: OBSTETRICS & GYNECOLOGY | Admitting: OBSTETRICS & GYNECOLOGY
Payer: COMMERCIAL

## 2021-01-03 VITALS
DIASTOLIC BLOOD PRESSURE: 78 MMHG | WEIGHT: 216.93 LBS | RESPIRATION RATE: 18 BRPM | HEART RATE: 86 BPM | TEMPERATURE: 98 F | SYSTOLIC BLOOD PRESSURE: 121 MMHG | HEIGHT: 61 IN

## 2021-01-03 DIAGNOSIS — Z98.890 OTHER SPECIFIED POSTPROCEDURAL STATES: Chronic | ICD-10-CM

## 2021-01-03 LAB
ALBUMIN SERPL ELPH-MCNC: 3.5 G/DL — SIGNIFICANT CHANGE UP (ref 3.5–5.2)
ALP SERPL-CCNC: 76 U/L — SIGNIFICANT CHANGE UP (ref 30–115)
ALT FLD-CCNC: 7 U/L — SIGNIFICANT CHANGE UP (ref 0–41)
ANION GAP SERPL CALC-SCNC: 13 MMOL/L — SIGNIFICANT CHANGE UP (ref 7–14)
APPEARANCE UR: CLEAR — SIGNIFICANT CHANGE UP
APPEARANCE UR: CLEAR — SIGNIFICANT CHANGE UP
AST SERPL-CCNC: 17 U/L — SIGNIFICANT CHANGE UP (ref 0–41)
BACTERIA # UR AUTO: NEGATIVE — SIGNIFICANT CHANGE UP
BASOPHILS # BLD AUTO: 0.02 K/UL — SIGNIFICANT CHANGE UP (ref 0–0.2)
BASOPHILS # BLD AUTO: 0.02 K/UL — SIGNIFICANT CHANGE UP (ref 0–0.2)
BASOPHILS NFR BLD AUTO: 0.1 % — SIGNIFICANT CHANGE UP (ref 0–1)
BASOPHILS NFR BLD AUTO: 0.2 % — SIGNIFICANT CHANGE UP (ref 0–1)
BILIRUB SERPL-MCNC: 0.3 MG/DL — SIGNIFICANT CHANGE UP (ref 0.2–1.2)
BILIRUB UR-MCNC: NEGATIVE — SIGNIFICANT CHANGE UP
BILIRUB UR-MCNC: NEGATIVE — SIGNIFICANT CHANGE UP
BLD GP AB SCN SERPL QL: SIGNIFICANT CHANGE UP
BUN SERPL-MCNC: 6 MG/DL — LOW (ref 10–20)
CALCIUM SERPL-MCNC: 9.1 MG/DL — SIGNIFICANT CHANGE UP (ref 8.5–10.1)
CHLORIDE SERPL-SCNC: 100 MMOL/L — SIGNIFICANT CHANGE UP (ref 98–110)
CO2 SERPL-SCNC: 20 MMOL/L — SIGNIFICANT CHANGE UP (ref 17–32)
COLOR SPEC: YELLOW — SIGNIFICANT CHANGE UP
COLOR SPEC: YELLOW — SIGNIFICANT CHANGE UP
CREAT SERPL-MCNC: <0.5 MG/DL — LOW (ref 0.7–1.5)
DIFF PNL FLD: ABNORMAL
DIFF PNL FLD: NEGATIVE — SIGNIFICANT CHANGE UP
EOSINOPHIL # BLD AUTO: 0 K/UL — SIGNIFICANT CHANGE UP (ref 0–0.7)
EOSINOPHIL # BLD AUTO: 0.11 K/UL — SIGNIFICANT CHANGE UP (ref 0–0.7)
EOSINOPHIL NFR BLD AUTO: 0 % — SIGNIFICANT CHANGE UP (ref 0–8)
EOSINOPHIL NFR BLD AUTO: 1.2 % — SIGNIFICANT CHANGE UP (ref 0–8)
EPI CELLS # UR: 6 /HPF — HIGH (ref 0–5)
GLUCOSE SERPL-MCNC: 108 MG/DL — HIGH (ref 70–99)
GLUCOSE UR QL: NEGATIVE — SIGNIFICANT CHANGE UP
GLUCOSE UR QL: NEGATIVE — SIGNIFICANT CHANGE UP
HCT VFR BLD CALC: 32.6 % — LOW (ref 37–47)
HCT VFR BLD CALC: 34.3 % — LOW (ref 37–47)
HGB BLD-MCNC: 10.9 G/DL — LOW (ref 12–16)
HGB BLD-MCNC: 11.4 G/DL — LOW (ref 12–16)
HYALINE CASTS # UR AUTO: 28 /LPF — HIGH (ref 0–7)
IMM GRANULOCYTES NFR BLD AUTO: 0.5 % — HIGH (ref 0.1–0.3)
IMM GRANULOCYTES NFR BLD AUTO: 1 % — HIGH (ref 0.1–0.3)
KETONES UR-MCNC: ABNORMAL
KETONES UR-MCNC: ABNORMAL
LEUKOCYTE ESTERASE UR-ACNC: ABNORMAL
LEUKOCYTE ESTERASE UR-ACNC: NEGATIVE — SIGNIFICANT CHANGE UP
LYMPHOCYTES # BLD AUTO: 0.89 K/UL — LOW (ref 1.2–3.4)
LYMPHOCYTES # BLD AUTO: 2.07 K/UL — SIGNIFICANT CHANGE UP (ref 1.2–3.4)
LYMPHOCYTES # BLD AUTO: 21.9 % — SIGNIFICANT CHANGE UP (ref 20.5–51.1)
LYMPHOCYTES # BLD AUTO: 6.3 % — LOW (ref 20.5–51.1)
MCHC RBC-ENTMCNC: 28.4 PG — SIGNIFICANT CHANGE UP (ref 27–31)
MCHC RBC-ENTMCNC: 29 PG — SIGNIFICANT CHANGE UP (ref 27–31)
MCHC RBC-ENTMCNC: 33.2 G/DL — SIGNIFICANT CHANGE UP (ref 32–37)
MCHC RBC-ENTMCNC: 33.4 G/DL — SIGNIFICANT CHANGE UP (ref 32–37)
MCV RBC AUTO: 85.3 FL — SIGNIFICANT CHANGE UP (ref 81–99)
MCV RBC AUTO: 86.7 FL — SIGNIFICANT CHANGE UP (ref 81–99)
MONOCYTES # BLD AUTO: 0.71 K/UL — HIGH (ref 0.1–0.6)
MONOCYTES # BLD AUTO: 0.72 K/UL — HIGH (ref 0.1–0.6)
MONOCYTES NFR BLD AUTO: 5.1 % — SIGNIFICANT CHANGE UP (ref 1.7–9.3)
MONOCYTES NFR BLD AUTO: 7.6 % — SIGNIFICANT CHANGE UP (ref 1.7–9.3)
NEUTROPHILS # BLD AUTO: 12.35 K/UL — HIGH (ref 1.4–6.5)
NEUTROPHILS # BLD AUTO: 6.43 K/UL — SIGNIFICANT CHANGE UP (ref 1.4–6.5)
NEUTROPHILS NFR BLD AUTO: 68.1 % — SIGNIFICANT CHANGE UP (ref 42.2–75.2)
NEUTROPHILS NFR BLD AUTO: 88 % — HIGH (ref 42.2–75.2)
NITRITE UR-MCNC: NEGATIVE — SIGNIFICANT CHANGE UP
NITRITE UR-MCNC: NEGATIVE — SIGNIFICANT CHANGE UP
NRBC # BLD: 0 /100 WBCS — SIGNIFICANT CHANGE UP (ref 0–0)
NRBC # BLD: 0 /100 WBCS — SIGNIFICANT CHANGE UP (ref 0–0)
PH UR: 6.5 — SIGNIFICANT CHANGE UP (ref 5–8)
PH UR: 6.5 — SIGNIFICANT CHANGE UP (ref 5–8)
PLATELET # BLD AUTO: 221 K/UL — SIGNIFICANT CHANGE UP (ref 130–400)
PLATELET # BLD AUTO: 246 K/UL — SIGNIFICANT CHANGE UP (ref 130–400)
POTASSIUM SERPL-MCNC: 3.9 MMOL/L — SIGNIFICANT CHANGE UP (ref 3.5–5)
POTASSIUM SERPL-SCNC: 3.9 MMOL/L — SIGNIFICANT CHANGE UP (ref 3.5–5)
PRENATAL SYPHILIS TEST: SIGNIFICANT CHANGE UP
PROT SERPL-MCNC: 6 G/DL — SIGNIFICANT CHANGE UP (ref 6–8)
PROT UR-MCNC: ABNORMAL
PROT UR-MCNC: NEGATIVE — SIGNIFICANT CHANGE UP
RBC # BLD: 3.76 M/UL — LOW (ref 4.2–5.4)
RBC # BLD: 4.02 M/UL — LOW (ref 4.2–5.4)
RBC # FLD: 13.2 % — SIGNIFICANT CHANGE UP (ref 11.5–14.5)
RBC # FLD: 13.2 % — SIGNIFICANT CHANGE UP (ref 11.5–14.5)
RBC CASTS # UR COMP ASSIST: 146 /HPF — HIGH (ref 0–4)
SODIUM SERPL-SCNC: 133 MMOL/L — LOW (ref 135–146)
SP GR SPEC: 1.02 — SIGNIFICANT CHANGE UP (ref 1.01–1.03)
SP GR SPEC: 1.04 — HIGH (ref 1.01–1.03)
UROBILINOGEN FLD QL: SIGNIFICANT CHANGE UP
UROBILINOGEN FLD QL: SIGNIFICANT CHANGE UP
WBC # BLD: 14.04 K/UL — HIGH (ref 4.8–10.8)
WBC # BLD: 9.44 K/UL — SIGNIFICANT CHANGE UP (ref 4.8–10.8)
WBC # FLD AUTO: 14.04 K/UL — HIGH (ref 4.8–10.8)
WBC # FLD AUTO: 9.44 K/UL — SIGNIFICANT CHANGE UP (ref 4.8–10.8)
WBC UR QL: 48 /HPF — HIGH (ref 0–5)

## 2021-01-03 PROCEDURE — 59510 CESAREAN DELIVERY: CPT | Mod: U9

## 2021-01-03 RX ORDER — SODIUM CHLORIDE 9 MG/ML
1000 INJECTION, SOLUTION INTRAVENOUS
Refills: 0 | Status: DISCONTINUED | OUTPATIENT
Start: 2021-01-03 | End: 2021-01-04

## 2021-01-03 RX ORDER — OXYTOCIN 10 UNIT/ML
333.33 VIAL (ML) INJECTION
Qty: 20 | Refills: 0 | Status: DISCONTINUED | OUTPATIENT
Start: 2021-01-03 | End: 2021-01-03

## 2021-01-03 RX ORDER — OXYCODONE HYDROCHLORIDE 5 MG/1
5 TABLET ORAL ONCE
Refills: 0 | Status: DISCONTINUED | OUTPATIENT
Start: 2021-01-03 | End: 2021-01-05

## 2021-01-03 RX ORDER — CITRIC ACID/SODIUM CITRATE 300-500 MG
30 SOLUTION, ORAL ORAL ONCE
Refills: 0 | Status: COMPLETED | OUTPATIENT
Start: 2021-01-03 | End: 2021-01-03

## 2021-01-03 RX ORDER — METOCLOPRAMIDE HCL 10 MG
10 TABLET ORAL ONCE
Refills: 0 | Status: COMPLETED | OUTPATIENT
Start: 2021-01-03 | End: 2021-01-03

## 2021-01-03 RX ORDER — SODIUM CHLORIDE 9 MG/ML
1000 INJECTION, SOLUTION INTRAVENOUS ONCE
Refills: 0 | Status: DISCONTINUED | OUTPATIENT
Start: 2021-01-03 | End: 2021-01-03

## 2021-01-03 RX ORDER — METOCLOPRAMIDE HCL 10 MG
5 TABLET ORAL EVERY 8 HOURS
Refills: 0 | Status: COMPLETED | OUTPATIENT
Start: 2021-01-03 | End: 2021-01-04

## 2021-01-03 RX ORDER — SIMETHICONE 80 MG/1
80 TABLET, CHEWABLE ORAL EVERY 4 HOURS
Refills: 0 | Status: DISCONTINUED | OUTPATIENT
Start: 2021-01-03 | End: 2021-01-05

## 2021-01-03 RX ORDER — DIPHENHYDRAMINE HCL 50 MG
25 CAPSULE ORAL EVERY 6 HOURS
Refills: 0 | Status: DISCONTINUED | OUTPATIENT
Start: 2021-01-03 | End: 2021-01-05

## 2021-01-03 RX ORDER — CEFAZOLIN SODIUM 1 G
2000 VIAL (EA) INJECTION ONCE
Refills: 0 | Status: COMPLETED | OUTPATIENT
Start: 2021-01-03 | End: 2021-01-03

## 2021-01-03 RX ORDER — LANOLIN
1 OINTMENT (GRAM) TOPICAL EVERY 6 HOURS
Refills: 0 | Status: DISCONTINUED | OUTPATIENT
Start: 2021-01-03 | End: 2021-01-05

## 2021-01-03 RX ORDER — FAMOTIDINE 10 MG/ML
20 INJECTION INTRAVENOUS ONCE
Refills: 0 | Status: COMPLETED | OUTPATIENT
Start: 2021-01-03 | End: 2021-01-03

## 2021-01-03 RX ORDER — ACETAMINOPHEN 500 MG
975 TABLET ORAL
Refills: 0 | Status: DISCONTINUED | OUTPATIENT
Start: 2021-01-03 | End: 2021-01-05

## 2021-01-03 RX ORDER — ONDANSETRON 8 MG/1
4 TABLET, FILM COATED ORAL ONCE
Refills: 0 | Status: COMPLETED | OUTPATIENT
Start: 2021-01-03 | End: 2021-01-03

## 2021-01-03 RX ORDER — MAGNESIUM HYDROXIDE 400 MG/1
30 TABLET, CHEWABLE ORAL
Refills: 0 | Status: DISCONTINUED | OUTPATIENT
Start: 2021-01-03 | End: 2021-01-05

## 2021-01-03 RX ORDER — SODIUM CHLORIDE 9 MG/ML
500 INJECTION, SOLUTION INTRAVENOUS ONCE
Refills: 0 | Status: COMPLETED | OUTPATIENT
Start: 2021-01-03 | End: 2021-01-03

## 2021-01-03 RX ORDER — SODIUM CHLORIDE 9 MG/ML
1000 INJECTION, SOLUTION INTRAVENOUS
Refills: 0 | Status: DISCONTINUED | OUTPATIENT
Start: 2021-01-03 | End: 2021-01-03

## 2021-01-03 RX ORDER — OXYTOCIN 10 UNIT/ML
333.33 VIAL (ML) INJECTION
Qty: 20 | Refills: 0 | Status: DISCONTINUED | OUTPATIENT
Start: 2021-01-03 | End: 2021-01-05

## 2021-01-03 RX ORDER — ENOXAPARIN SODIUM 100 MG/ML
40 INJECTION SUBCUTANEOUS DAILY
Refills: 0 | Status: DISCONTINUED | OUTPATIENT
Start: 2021-01-03 | End: 2021-01-05

## 2021-01-03 RX ORDER — OXYCODONE HYDROCHLORIDE 5 MG/1
5 TABLET ORAL
Refills: 0 | Status: DISCONTINUED | OUTPATIENT
Start: 2021-01-03 | End: 2021-01-05

## 2021-01-03 RX ORDER — KETOROLAC TROMETHAMINE 30 MG/ML
30 SYRINGE (ML) INJECTION EVERY 6 HOURS
Refills: 0 | Status: DISCONTINUED | OUTPATIENT
Start: 2021-01-03 | End: 2021-01-04

## 2021-01-03 RX ORDER — SODIUM CHLORIDE 9 MG/ML
500 INJECTION, SOLUTION INTRAVENOUS
Refills: 0 | Status: COMPLETED | OUTPATIENT
Start: 2021-01-03 | End: 2021-01-03

## 2021-01-03 RX ORDER — IBUPROFEN 200 MG
600 TABLET ORAL EVERY 6 HOURS
Refills: 0 | Status: COMPLETED | OUTPATIENT
Start: 2021-01-03 | End: 2021-12-02

## 2021-01-03 RX ADMIN — FAMOTIDINE 20 MILLIGRAM(S): 10 INJECTION INTRAVENOUS at 08:27

## 2021-01-03 RX ADMIN — ONDANSETRON 4 MILLIGRAM(S): 8 TABLET, FILM COATED ORAL at 19:14

## 2021-01-03 RX ADMIN — SIMETHICONE 80 MILLIGRAM(S): 80 TABLET, CHEWABLE ORAL at 15:23

## 2021-01-03 RX ADMIN — Medication 975 MILLIGRAM(S): at 21:21

## 2021-01-03 RX ADMIN — Medication 100 MILLIGRAM(S): at 08:30

## 2021-01-03 RX ADMIN — Medication 10 MILLIGRAM(S): at 08:32

## 2021-01-03 RX ADMIN — SODIUM CHLORIDE 125 MILLILITER(S): 9 INJECTION, SOLUTION INTRAVENOUS at 18:05

## 2021-01-03 RX ADMIN — Medication 30 MILLIGRAM(S): at 18:19

## 2021-01-03 RX ADMIN — Medication 30 MILLIGRAM(S): at 18:05

## 2021-01-03 RX ADMIN — Medication 5 MILLIGRAM(S): at 21:24

## 2021-01-03 RX ADMIN — Medication 975 MILLIGRAM(S): at 15:21

## 2021-01-03 RX ADMIN — Medication 975 MILLIGRAM(S): at 15:49

## 2021-01-03 RX ADMIN — SODIUM CHLORIDE 1000 MILLILITER(S): 9 INJECTION, SOLUTION INTRAVENOUS at 18:05

## 2021-01-03 RX ADMIN — SODIUM CHLORIDE 1000 MILLILITER(S): 9 INJECTION, SOLUTION INTRAVENOUS at 15:59

## 2021-01-03 RX ADMIN — Medication 975 MILLIGRAM(S): at 21:55

## 2021-01-03 RX ADMIN — ENOXAPARIN SODIUM 40 MILLIGRAM(S): 100 INJECTION SUBCUTANEOUS at 22:19

## 2021-01-03 RX ADMIN — SIMETHICONE 80 MILLIGRAM(S): 80 TABLET, CHEWABLE ORAL at 21:24

## 2021-01-03 RX ADMIN — SIMETHICONE 80 MILLIGRAM(S): 80 TABLET, CHEWABLE ORAL at 18:05

## 2021-01-03 RX ADMIN — Medication 30 MILLILITER(S): at 08:26

## 2021-01-03 NOTE — BRIEF OPERATIVE NOTE - NSICDXBRIEFPOSTOP_GEN_ALL_CORE_FT
POST-OP DIAGNOSIS:  Breech presentation 03-Jan-2021 10:35:30  Masha Jameson  Term pregnancy 03-Jan-2021 10:35:23  Masha Jameson

## 2021-01-03 NOTE — OB PROVIDER H&P - HISTORY OF PRESENT ILLNESS
29 yo  at 39w0d by first trimester sono presents for scheduled  section for breech presentation. She denies acute complaints, including contractions, leakage of fluid, or vaginal bleeding. She feels regular fetal movement. She denies complications with this pregnancy.

## 2021-01-03 NOTE — OB PROVIDER H&P - NSHPPHYSICALEXAM_GEN_ALL_CORE
Vital Signs Last 24 Hrs  T(F): 98.4 (03 Jan 2021 06:08), Max: 98.4 (03 Jan 2021 06:08)  HR: 86 (03 Jan 2021 06:12) (86 - 86)  BP: 121/78 (03 Jan 2021 06:12) (121/78 - 121/78)  RR: 18 (03 Jan 2021 06:08) (18 - 18)    EFM: 130/mod/accels  [place]: occasional    Gen: AAOx3, NAD  Abd: Gravid, soft, nontender, no palpable contractions, no incisional tenderness

## 2021-01-03 NOTE — OB RN INTRAOPERATIVE NOTE - NS_PROVIDERPREP_OBGYN_ALL_OB
"Mac Pierre is a 31 y.o. male who presents for bilateral knee pain.    Chief Complaint   Patient presents with   • Establish Care     left knee pain       Knee Pain    The incident occurred 3 to 5 days ago. The injury mechanism was a direct blow and a twisting injury. The pain is present in the left knee. The quality of the pain is described as shooting and aching. The pain is at a severity of 6/10. The pain is moderate. The pain has been intermittent since onset. Associated symptoms include muscle weakness. He reports no foreign bodies present. The symptoms are aggravated by movement, weight bearing and palpation. He has tried elevation, ice, non-weight bearing, rest and NSAIDs for the symptoms. The treatment provided no relief.         Past Medical History:   Diagnosis Date   • Injury of back    • Myocarditis (CMS/HCC)        Past Surgical History:   Procedure Laterality Date   • APPENDECTOMY     • MEDIAL COLLATERAL LIGAMENT REPAIR, KNEE     • SHOULDER SURGERY         Family History   Problem Relation Age of Onset   • No Known Problems Mother    • No Known Problems Father    • No Known Problems Sister        Social History     Socioeconomic History   • Marital status:      Spouse name: Not on file   • Number of children: Not on file   • Years of education: Not on file   • Highest education level: Not on file   Tobacco Use   • Smoking status: Never Smoker   • Smokeless tobacco: Never Used   Substance and Sexual Activity   • Alcohol use: Yes     Frequency: Never     Comment: RARE   • Drug use: No   • Sexual activity: Defer       No Known Allergies    ROS    Review of Systems   Musculoskeletal: Positive for arthralgias, gait problem and myalgias.       Vitals:    12/23/19 1652   BP: 124/82   Pulse: 58   Resp: 16   Temp: 98.2 °F (36.8 °C)   SpO2: 96%   Weight: 83 kg (183 lb)   Height: 177.8 cm (70\")   PainSc:   2   PainLoc: Knee  Comment: left         Current Outpatient Medications:   •  valACYclovir " (VALTREX) 500 MG tablet, , Disp: , Rfl:   •  diclofenac (VOLTAREN) 1 % gel gel, Apply 4 g topically to the appropriate area as directed 4 (Four) Times a Day As Needed (bilateral knee pain)., Disp: 100 g, Rfl: 1    PE    Physical Exam   Constitutional: He is oriented to person, place, and time. He appears well-developed and well-nourished.   HENT:   Head: Normocephalic and atraumatic.   Cardiovascular: Normal rate, regular rhythm, normal heart sounds and intact distal pulses. Exam reveals no gallop and no friction rub.   No murmur heard.  Pulmonary/Chest: Effort normal and breath sounds normal. No stridor. No respiratory distress. He has no wheezes.   Musculoskeletal: He exhibits tenderness.        Right knee: He exhibits decreased range of motion. Tenderness found. Patellar tendon tenderness noted.        Left knee: He exhibits decreased range of motion, swelling and LCL laxity. Tenderness found. Lateral joint line and LCL tenderness noted.   Neurological: He is alert and oriented to person, place, and time.   Skin: Skin is warm and dry.   Psychiatric: He has a normal mood and affect. His behavior is normal. Judgment and thought content normal.   Nursing note and vitals reviewed.       A/P    Problem List Items Addressed This Visit        Musculoskeletal and Integument    Patellar tendonitis of right knee    Relevant Orders    MRI Knee Right Without Contrast      Other Visit Diagnoses     Chronic pain of right knee    -  Primary    Relevant Medications    diclofenac (VOLTAREN) 1 % gel gel    Other Relevant Orders    MRI Knee Right Without Contrast    Post-traumatic osteoarthritis of both knees        Relevant Medications    diclofenac (VOLTAREN) 1 % gel gel    Other Relevant Orders    MRI Knee Left Without Contrast    MRI Knee Right Without Contrast    Acute injury of left knee cartilage, initial encounter        Relevant Orders    MRI Knee Left Without Contrast    Lateral collateral ligament deficiency of left knee         Relevant Orders    MRI Knee Left Without Contrast      Patient has had bilateral knee xrays which were negative except for mild degenerative changes.    Plan of care reviewed with patient at the conclusion of today's visit. Education was provided regarding diagnosis, management and any prescribed or recommended OTC medications.  Patient verbalizes understanding of and agreement with management plan.    Return will contact patient with results.     Jane Walton, APRN   Yes

## 2021-01-03 NOTE — BRIEF OPERATIVE NOTE - OPERATION/FINDINGS
Normal appearing uterus, tubes and ovaries bilaterally.  AROM clear.  Fetus delivered in breech presentation without difficulty, APGARs 8/9, weighing 3860g.

## 2021-01-03 NOTE — OB PROVIDER H&P - ATTENDING COMMENTS
29yo  @ 39 wks came for scheduled elective P C/S for breech presentation.  She has declined attempt for ECV and also had succenturiate placenta lobe that may also have been concern for ECV. She denies VB, LOF, occasional CTXs, +FM.  She had recently been treated for trich and CLARITA negative as well as treated twice for UTI and currently finishing abx.  She is obese but no other significant comorbidities or complications during pregnancy.   She had glucose intolerance.  GBS negative.  Mumps non-immune.    Patient was counseled on all R/B/A for surgery, including but not limited to, hemorrhage, hysterectomy, blood transfusion, bowel/bladder/organ injury and repair, and infection.  All questions answered satisfactorily and patient signed informed, witnessed consent.    A: 29yo  # 39 wks for P C/S for breech presentation, glucose intolerance, obesity    P: admit     NPO     IV hydration     admission labs     prep/shave     bicitra     iv abx     venodyne compression stockings     muhammad to be placed in OR     anesthesia consulted     MMR PP     on call to OR

## 2021-01-03 NOTE — OB PROVIDER H&P - NSHPLABSRESULTS_GEN_ALL_CORE
Sonos:  5w3d single IUP, dating confirmed  20w5d normal anatomic survey (limited by body habitus), placenta with succenturiate lobe

## 2021-01-03 NOTE — PRE-ANESTHESIA EVALUATION ADULT - NSANTHOSAYNRD_GEN_A_CORE
No. PRINCESS screening performed.  STOP BANG Legend: 0-2 = LOW Risk; 3-4 = INTERMEDIATE Risk; 5-8 = HIGH Risk

## 2021-01-03 NOTE — OB PROVIDER H&P - ASSESSMENT
31 yo  at 39w0d, mumps non-immune, breech presentation, for primary  section  -NPO  -IV fluids  -Sun and skin prep  -MMR postpartum  -ancef for prophylaxis  -anesthesia and pediatric teams notified    Discussed with Dr. Cast

## 2021-01-03 NOTE — CHART NOTE - NSCHARTNOTEFT_GEN_A_CORE
PACU ANESTHESIA ADMISSION NOTE      Procedure: Primary  section      Post op diagnosis:  Breech presentation    Term pregnancy        ____  Intubated  TV:______       Rate: ______      FiO2: ______    _x___  Patent Airway    _x___  Full return of protective reflexes    __x__ Regressing NAB    Vitals:   T:   97.5F        R:    16              BP:   100/59               Sat:    96               P: 85      Mental Status:  __x__ Awake   __x___ Alert   _____ Drowsy   _____ Sedated    Nausea/Vomiting:  ____ NO  ___x___Yes,   See Post - Op Orders          Pain Scale (0-10):  __0/10___    Treatment: ____ None    __x__ See Post - Op/PCA Orders    Post - Operative Fluids:   ____ Oral   _x___ See Post - Op Orders    Plan: Discharge:   ____Home       __x___Floor     _____Critical Care    _____  Other:_________________    Comments: Pt tolerated procedure well, no anesthesia related complications. Care of pt endorsed to PACU, report given to PACU RN. Discharge when criteria are met.

## 2021-01-03 NOTE — BRIEF OPERATIVE NOTE - NSICDXBRIEFPREOP_GEN_ALL_CORE_FT
PRE-OP DIAGNOSIS:  Breech presentation 03-Jan-2021 10:35:16  Masha Jameson  Term pregnancy 03-Jan-2021 10:35:09  Masha Jameson

## 2021-01-04 LAB
ANION GAP SERPL CALC-SCNC: 8 MMOL/L — SIGNIFICANT CHANGE UP (ref 7–14)
BASOPHILS # BLD AUTO: 0.02 K/UL — SIGNIFICANT CHANGE UP (ref 0–0.2)
BASOPHILS NFR BLD AUTO: 0.2 % — SIGNIFICANT CHANGE UP (ref 0–1)
BUN SERPL-MCNC: 4 MG/DL — LOW (ref 10–20)
CALCIUM SERPL-MCNC: 8.8 MG/DL — SIGNIFICANT CHANGE UP (ref 8.5–10.1)
CHLORIDE SERPL-SCNC: 101 MMOL/L — SIGNIFICANT CHANGE UP (ref 98–110)
CO2 SERPL-SCNC: 25 MMOL/L — SIGNIFICANT CHANGE UP (ref 17–32)
CREAT SERPL-MCNC: <0.5 MG/DL — LOW (ref 0.7–1.5)
CULTURE RESULTS: SIGNIFICANT CHANGE UP
EOSINOPHIL # BLD AUTO: 0.04 K/UL — SIGNIFICANT CHANGE UP (ref 0–0.7)
EOSINOPHIL NFR BLD AUTO: 0.4 % — SIGNIFICANT CHANGE UP (ref 0–8)
GLUCOSE SERPL-MCNC: 76 MG/DL — SIGNIFICANT CHANGE UP (ref 70–99)
HCT VFR BLD CALC: 31.5 % — LOW (ref 37–47)
HGB BLD-MCNC: 10.3 G/DL — LOW (ref 12–16)
IMM GRANULOCYTES NFR BLD AUTO: 0.6 % — HIGH (ref 0.1–0.3)
LYMPHOCYTES # BLD AUTO: 1.84 K/UL — SIGNIFICANT CHANGE UP (ref 1.2–3.4)
LYMPHOCYTES # BLD AUTO: 17.6 % — LOW (ref 20.5–51.1)
MCHC RBC-ENTMCNC: 28.8 PG — SIGNIFICANT CHANGE UP (ref 27–31)
MCHC RBC-ENTMCNC: 32.7 G/DL — SIGNIFICANT CHANGE UP (ref 32–37)
MCV RBC AUTO: 88 FL — SIGNIFICANT CHANGE UP (ref 81–99)
MONOCYTES # BLD AUTO: 0.79 K/UL — HIGH (ref 0.1–0.6)
MONOCYTES NFR BLD AUTO: 7.6 % — SIGNIFICANT CHANGE UP (ref 1.7–9.3)
NEUTROPHILS # BLD AUTO: 7.7 K/UL — HIGH (ref 1.4–6.5)
NEUTROPHILS NFR BLD AUTO: 73.6 % — SIGNIFICANT CHANGE UP (ref 42.2–75.2)
NRBC # BLD: 0 /100 WBCS — SIGNIFICANT CHANGE UP (ref 0–0)
PLATELET # BLD AUTO: 222 K/UL — SIGNIFICANT CHANGE UP (ref 130–400)
POTASSIUM SERPL-MCNC: 4.3 MMOL/L — SIGNIFICANT CHANGE UP (ref 3.5–5)
POTASSIUM SERPL-SCNC: 4.3 MMOL/L — SIGNIFICANT CHANGE UP (ref 3.5–5)
RBC # BLD: 3.58 M/UL — LOW (ref 4.2–5.4)
RBC # FLD: 13.2 % — SIGNIFICANT CHANGE UP (ref 11.5–14.5)
SARS-COV-2 IGG SERPL QL IA: NEGATIVE — SIGNIFICANT CHANGE UP
SARS-COV-2 IGM SERPL IA-ACNC: 0.07 INDEX — SIGNIFICANT CHANGE UP
SODIUM SERPL-SCNC: 134 MMOL/L — LOW (ref 135–146)
SPECIMEN SOURCE: SIGNIFICANT CHANGE UP
WBC # BLD: 10.45 K/UL — SIGNIFICANT CHANGE UP (ref 4.8–10.8)
WBC # FLD AUTO: 10.45 K/UL — SIGNIFICANT CHANGE UP (ref 4.8–10.8)

## 2021-01-04 RX ORDER — IBUPROFEN 200 MG
600 TABLET ORAL EVERY 6 HOURS
Refills: 0 | Status: DISCONTINUED | OUTPATIENT
Start: 2021-01-04 | End: 2021-01-05

## 2021-01-04 RX ADMIN — SIMETHICONE 80 MILLIGRAM(S): 80 TABLET, CHEWABLE ORAL at 12:25

## 2021-01-04 RX ADMIN — Medication 5 MILLIGRAM(S): at 05:44

## 2021-01-04 RX ADMIN — Medication 975 MILLIGRAM(S): at 16:03

## 2021-01-04 RX ADMIN — SIMETHICONE 80 MILLIGRAM(S): 80 TABLET, CHEWABLE ORAL at 05:43

## 2021-01-04 RX ADMIN — SIMETHICONE 80 MILLIGRAM(S): 80 TABLET, CHEWABLE ORAL at 21:10

## 2021-01-04 RX ADMIN — Medication 600 MILLIGRAM(S): at 13:03

## 2021-01-04 RX ADMIN — Medication 600 MILLIGRAM(S): at 12:25

## 2021-01-04 RX ADMIN — Medication 975 MILLIGRAM(S): at 21:55

## 2021-01-04 RX ADMIN — Medication 600 MILLIGRAM(S): at 05:43

## 2021-01-04 RX ADMIN — Medication 975 MILLIGRAM(S): at 10:02

## 2021-01-04 RX ADMIN — ENOXAPARIN SODIUM 40 MILLIGRAM(S): 100 INJECTION SUBCUTANEOUS at 21:12

## 2021-01-04 RX ADMIN — Medication 975 MILLIGRAM(S): at 09:09

## 2021-01-04 RX ADMIN — Medication 30 MILLIGRAM(S): at 00:09

## 2021-01-04 RX ADMIN — Medication 5 MILLIGRAM(S): at 15:06

## 2021-01-04 RX ADMIN — Medication 600 MILLIGRAM(S): at 23:44

## 2021-01-04 RX ADMIN — Medication 30 MILLIGRAM(S): at 00:42

## 2021-01-04 RX ADMIN — Medication 975 MILLIGRAM(S): at 15:06

## 2021-01-04 RX ADMIN — Medication 600 MILLIGRAM(S): at 06:30

## 2021-01-04 RX ADMIN — SIMETHICONE 80 MILLIGRAM(S): 80 TABLET, CHEWABLE ORAL at 15:06

## 2021-01-04 RX ADMIN — SIMETHICONE 80 MILLIGRAM(S): 80 TABLET, CHEWABLE ORAL at 18:30

## 2021-01-04 RX ADMIN — Medication 975 MILLIGRAM(S): at 21:10

## 2021-01-04 RX ADMIN — Medication 600 MILLIGRAM(S): at 18:30

## 2021-01-05 ENCOUNTER — TRANSCRIPTION ENCOUNTER (OUTPATIENT)
Age: 31
End: 2021-01-05

## 2021-01-05 VITALS
HEART RATE: 67 BPM | TEMPERATURE: 99 F | DIASTOLIC BLOOD PRESSURE: 70 MMHG | RESPIRATION RATE: 18 BRPM | SYSTOLIC BLOOD PRESSURE: 124 MMHG

## 2021-01-05 LAB
SARS-COV-2 IGG SERPL QL IA: NEGATIVE — SIGNIFICANT CHANGE UP
SARS-COV-2 IGM SERPL IA-ACNC: <0.1 INDEX — SIGNIFICANT CHANGE UP

## 2021-01-05 PROCEDURE — 93970 EXTREMITY STUDY: CPT | Mod: 26

## 2021-01-05 RX ORDER — FERROUS SULFATE 325(65) MG
1 TABLET ORAL
Qty: 60 | Refills: 0
Start: 2021-01-05

## 2021-01-05 RX ORDER — SIMETHICONE 80 MG/1
1 TABLET, CHEWABLE ORAL
Qty: 0 | Refills: 0 | DISCHARGE
Start: 2021-01-05

## 2021-01-05 RX ORDER — ASCORBIC ACID 60 MG
1 TABLET,CHEWABLE ORAL
Qty: 30 | Refills: 0
Start: 2021-01-05

## 2021-01-05 RX ORDER — IBUPROFEN 200 MG
1 TABLET ORAL
Qty: 0 | Refills: 0 | DISCHARGE
Start: 2021-01-05

## 2021-01-05 RX ORDER — ACETAMINOPHEN 500 MG
3 TABLET ORAL
Qty: 0 | Refills: 0 | DISCHARGE
Start: 2021-01-05

## 2021-01-05 RX ADMIN — Medication 975 MILLIGRAM(S): at 04:07

## 2021-01-05 RX ADMIN — Medication 600 MILLIGRAM(S): at 12:22

## 2021-01-05 RX ADMIN — MAGNESIUM HYDROXIDE 30 MILLILITER(S): 400 TABLET, CHEWABLE ORAL at 09:55

## 2021-01-05 RX ADMIN — Medication 975 MILLIGRAM(S): at 09:56

## 2021-01-05 RX ADMIN — Medication 975 MILLIGRAM(S): at 03:11

## 2021-01-05 RX ADMIN — SIMETHICONE 80 MILLIGRAM(S): 80 TABLET, CHEWABLE ORAL at 15:36

## 2021-01-05 RX ADMIN — Medication 975 MILLIGRAM(S): at 09:21

## 2021-01-05 RX ADMIN — SIMETHICONE 80 MILLIGRAM(S): 80 TABLET, CHEWABLE ORAL at 06:15

## 2021-01-05 RX ADMIN — Medication 600 MILLIGRAM(S): at 12:20

## 2021-01-05 RX ADMIN — Medication 600 MILLIGRAM(S): at 06:15

## 2021-01-05 RX ADMIN — Medication 600 MILLIGRAM(S): at 07:21

## 2021-01-05 RX ADMIN — Medication 600 MILLIGRAM(S): at 00:20

## 2021-01-05 RX ADMIN — SIMETHICONE 80 MILLIGRAM(S): 80 TABLET, CHEWABLE ORAL at 09:56

## 2021-01-05 RX ADMIN — Medication 0.5 MILLILITER(S): at 12:30

## 2021-01-05 RX ADMIN — Medication 975 MILLIGRAM(S): at 15:43

## 2021-01-05 RX ADMIN — Medication 975 MILLIGRAM(S): at 17:22

## 2021-01-05 NOTE — DISCHARGE NOTE OB - PATIENT PORTAL LINK FT
You can access the FollowMyHealth Patient Portal offered by [facility] by registering at the following website: [email or web address]. By joining [employer]’s FollowMyHealth portal, you will also be able to view your health information using other applications (apps) compatible with our system.

## 2021-01-05 NOTE — DISCHARGE NOTE OB - CARE PROVIDER_API CALL
Myranda Cast)  Obstetrics and Gynecology  [facility], [address]  [place] [address]  Phone: [phone]  Fax: ([phone]  Follow Up Time:

## 2021-01-05 NOTE — PROGRESS NOTE ADULT - ASSESSMENT
A/P: 29yo P3 s/p primary LTCS for breech presentation, POD#0, recovering well   -ambulation encouraged  -PO hydration encouraged  -regular diet  -lovenox ordered for DVT prophylaxis  -Incentive Spirometry ordered  -pain management per routine  -UO currently inadequate, recommend increased PO fluid intake  -f/u 1430 CBC, CMP, UA  -f/u ucx    Dr. Cast and Dr. Rice aware.
A/P: 31yo P3 s/p primary LTCS for breech presentation, POD#1, recovering well   -ambulation encouraged  -PO hydration encouraged  -regular diet  -lovenox ordered for DVT prophylaxis  -Incentive Spirometry ordered  -pain management per routine  -UO adequate, muhammad removed, TOV 1230  -f/u ucx    Dr. Cast and Dr. Rice aware.  
A/P: 31yo P3 s/p primary LTCS for breech presentation, POD#2, recovering well   -ambulation encouraged  -PO hydration encouraged  -regular diet  -lovenox ordered for DVT prophylaxis  -Incentive Spirometry ordered  -pain management per routine  -f/u LE duplex  -anticipate d/c home today pending duplex  -instructed to follow up in 1 week for incision check, and 6 weeks for postpartum check     Dr. Cast and Dr. Rice to be made aware.

## 2021-01-05 NOTE — DISCHARGE NOTE OB - CARE PLAN
Principal Discharge DX:	 delivery delivered  Goal:	healthy recovery  Assessment and plan of treatment:	Monitor vitals/labs.  To follow up with provider in 1 week for incision check, 6 weeks for postpartum visit.

## 2021-01-05 NOTE — DISCHARGE NOTE OB - MEDICATION SUMMARY - MEDICATIONS TO TAKE
I will START or STAY ON the medications listed below when I get home from the hospital:    acetaminophen 325 mg oral tablet  -- 3 tab(s) by mouth every 6 hours, As Needed  -- Indication: For pain    ibuprofen 600 mg oral tablet  -- 1 tab(s) by mouth every 6 hours, As Needed  -- Indication: For pain    simethicone 80 mg oral tablet, chewable  -- 1 tab(s) by mouth every 4 hours, As Needed  -- Indication: For gas

## 2021-01-05 NOTE — PROGRESS NOTE ADULT - SUBJECTIVE AND OBJECTIVE BOX
MICHAEL LONG  30y  Female    PGY2 Note:  Patient seen and examined bedside. No overnight events. Denies heavy vaginal bleeding. Pain well controlled. Ambulating to chair without difficulty. Tolerating diet, muhammad in place, not yet passing flatus, no BM. Breastfeeding and bottle-feeding.     Physical Exam  Vital Signs Last 24 Hrs  T(C): 36.6 (2021 05:02), Max: 36.9 (2021 08:14)  T(F): 97.9 (2021 05:02), Max: 98.1 (2021 10:30)  HR: 66 (2021 05:02) (61 - 100)  BP: 105/65 (2021 05:02) (98/51 - 139/81)  RR: 18 (2021 05:02) (16 - 18)  SpO2: 98% (2021 05:02) (92% - 98%)    UO: 740cc from 7193-1175, 123cc/hr, adequate 49cc/hr    Gen: NAD, sitting comfortably  CV: RRR. No murmurs gallops or rubs.  Pulm: CTAB. No wheezes or rales.  Ext: No calf tenderness, no swelling.   Abd: Nondistended, soft, nontender, BS+, fundus firm, and below umbilicus.   Lochia: Minimal rubra  Wound: Pfannenstiel skin incision clean, dry intact. Dermabond in place. No surrounding edema or erythema.        PAST MEDICAL & SURGICAL HISTORY:  No pertinent past medical history    H/O dilation and curettage      Diet: regular    Labs:                        10.3   10.45 )-----------( 222      ( 2021 04:57 )             31.5                         10.9   14.04 )-----------( 221      ( 2021 15:03 )             32.6      01-04    134<L>  |  101  |  4<L>  ----------------------------<  76  4.3   |  25  |  <0.5<L>    Ca    8.8      2021 04:57    TPro  6.0  /  Alb  3.5  /  TBili  0.3  /  DBili  x   /  AST  17  /  ALT  7   /  AlkPhos  76      Complete Blood Count + Automated Diff (21 @ 07:25)    WBC Count: 9.44 K/uL    RBC Count: 4.02 M/uL    Hemoglobin: 11.4 g/dL    Hematocrit: 34.3 %    Mean Cell Volume: 85.3 fL    Mean Cell Hemoglobin: 28.4 pg    Mean Cell Hemoglobin Conc: 33.2 g/dL    Red Cell Distrib Width: 13.2 %    Platelet Count - Automated: 246 K/uL    Auto Neutrophil #: 6.43 K/uL    Auto Lymphocyte #: 2.07 K/uL    Auto Monocyte #: 0.72 K/uL    Auto Eosinophil #: 0.11 K/uL    Auto Basophil #: 0.02 K/uL    Auto Neutrophil %: 68.1: Differential percentages must be correlated with absolute numbers for  clinical significance. %    Auto Lymphocyte %: 21.9 %    Auto Monocyte %: 7.6 %    Auto Eosinophil %: 1.2 %    Auto Basophil %: 0.2 %    Auto Immature Granulocyte %: 1.0 %    Nucleated RBC: 0 /100 WBCs        RPR NR  O pos, antibody neg    Urinalysis Basic - ( 2021 15:03 )    Color: Yellow / Appearance: Clear / S.039 / pH: x  Gluc: x / Ketone: Moderate  / Bili: Negative / Urobili: <2 mg/dL   Blood: x / Protein: 100 mg/dL / Nitrite: Negative   Leuk Esterase: Moderate / RBC: 146 /HPF / WBC 48 /HPF   Sq Epi: x / Non Sq Epi: 6 /HPF / Bacteria: Negative        MEDICATIONS  (STANDING):  acetaminophen   Tablet .. 975 milliGRAM(s) Oral <User Schedule>  enoxaparin Injectable 40 milliGRAM(s) SubCutaneous daily  ibuprofen  Tablet. 600 milliGRAM(s) Oral every 6 hours  lactated ringers. 1000 milliLiter(s) (125 mL/Hr) IV Continuous <Continuous>  measles/mumps/rubella Vaccine 0.5 milliLiter(s) SubCutaneous once  metoclopramide 5 milliGRAM(s) Oral every 8 hours  oxytocin Infusion 333.333 milliUNIT(s)/Min (1000 mL/Hr) IV Continuous <Continuous>  simethicone 80 milliGRAM(s) Chew every 4 hours    MEDICATIONS  (PRN):  diphenhydrAMINE 25 milliGRAM(s) Oral every 6 hours PRN Pruritus  lanolin Ointment 1 Application(s) Topical every 6 hours PRN Sore Nipples  magnesium hydroxide Suspension 30 milliLiter(s) Oral two times a day PRN Constipation  oxyCODONE    IR 5 milliGRAM(s) Oral every 3 hours PRN Moderate to Severe Pain (4-10)  oxyCODONE    IR 5 milliGRAM(s) Oral once PRN Moderate to Severe Pain (4-10)        
 MICHAEL LONG  30y  Female    PGY2 Note:  Patient seen and examined bedside. No overnight events. Denies heavy vaginal bleeding. Pain well controlled. Ambulating without difficulty. Tolerating diet, voiding, passing flatus, no BM. Reports left leg cramping with ambulation, but otherwise denies headache, changes in vision, chest pain, SOB, RUQ/epigastric pain, N/V, fevers, chills, diarrhea, LE swelling. Breastfeeding and bottle-feeding.     Physical Exam  Vital Signs Last 24 Hrs  T(C): 36.9 (2021 23:22), Max: 37.2 (2021 07:32)  T(F): 98.4 (2021 23:22), Max: 98.9 (2021 07:32)  HR: 71 (2021 23:22) (62 - 74)  BP: 124/83 (2021 23:22) (110/58 - 124/83)  RR: 18 (2021 23:22) (18 - 18)    Gen: NAD, sitting comfortably  CV: RRR. No murmurs gallops or rubs.  Pulm: CTAB. No wheezes or rales.  Ext: No calf tenderness, no swelling.   Abd: Nondistended, soft, nontender, BS+, fundus firm, and below umbilicus.   Lochia: Minimal rubra  Wound: Pfannenstiel skin incision clean, dry intact. Dermabond in place. No surrounding edema or erythema.        PAST MEDICAL & SURGICAL HISTORY:  No pertinent past medical history    H/O dilation and curettage    Diet: regular    Labs:                        10.3   10.45 )-----------( 222      ( 2021 04:57 )             31.5                         10.9   14.04 )-----------( 221      ( 2021 15:03 )             32.6      Complete Blood Count + Automated Diff (21 @ 07:25)    WBC Count: 9.44 K/uL    RBC Count: 4.02 M/uL    Hemoglobin: 11.4 g/dL    Hematocrit: 34.3 %    Mean Cell Volume: 85.3 fL    Mean Cell Hemoglobin: 28.4 pg    Mean Cell Hemoglobin Conc: 33.2 g/dL    Red Cell Distrib Width: 13.2 %    Platelet Count - Automated: 246 K/uL    Auto Neutrophil #: 6.43 K/uL    Auto Lymphocyte #: 2.07 K/uL    Auto Monocyte #: 0.72 K/uL    Auto Eosinophil #: 0.11 K/uL    Auto Basophil #: 0.02 K/uL    Auto Neutrophil %: 68.1: Differential percentages must be correlated with absolute numbers for  clinical significance. %    Auto Lymphocyte %: 21.9 %    Auto Monocyte %: 7.6 %    Auto Eosinophil %: 1.2 %    Auto Basophil %: 0.2 %    Auto Immature Granulocyte %: 1.0 %    Nucleated RBC: 0 /100 WBCs          134<L>  |  101  |  4<L>  ----------------------------<  76  4.3   |  25  |  <0.5<L>    Ca    8.8      2021 04:57    TPro  6.0  /  Alb  3.5  /  TBili  0.3  /  DBili  x   /  AST  17  /  ALT  7   /  AlkPhos  76        RPR NR  O pos, antibody neg  Culture - Urine (21 @ 10:34)    Specimen Source: .Urine Catheterized    Culture Results:   <10,000 CFU/mL Normal Urogenital Krystyna    Urinalysis Basic - ( 2021 15:03 )    Color: Yellow / Appearance: Clear / S.039 / pH: x  Gluc: x / Ketone: Moderate  / Bili: Negative / Urobili: <2 mg/dL   Blood: x / Protein: 100 mg/dL / Nitrite: Negative   Leuk Esterase: Moderate / RBC: 146 /HPF / WBC 48 /HPF   Sq Epi: x / Non Sq Epi: 6 /HPF / Bacteria: Negative      MEDICATIONS  (STANDING):  acetaminophen   Tablet .. 975 milliGRAM(s) Oral <User Schedule>  enoxaparin Injectable 40 milliGRAM(s) SubCutaneous daily  ibuprofen  Tablet. 600 milliGRAM(s) Oral every 6 hours  measles/mumps/rubella Vaccine 0.5 milliLiter(s) SubCutaneous once  oxytocin Infusion 333.333 milliUNIT(s)/Min (1000 mL/Hr) IV Continuous <Continuous>  simethicone 80 milliGRAM(s) Chew every 4 hours    MEDICATIONS  (PRN):  diphenhydrAMINE 25 milliGRAM(s) Oral every 6 hours PRN Pruritus  lanolin Ointment 1 Application(s) Topical every 6 hours PRN Sore Nipples  magnesium hydroxide Suspension 30 milliLiter(s) Oral two times a day PRN Constipation  oxyCODONE    IR 5 milliGRAM(s) Oral every 3 hours PRN Moderate to Severe Pain (4-10)  oxyCODONE    IR 5 milliGRAM(s) Oral once PRN Moderate to Severe Pain (4-10)      
 MICHAEL LONG  30y  Female    PGY2 Note:  Patient seen and examined bedside. Denies heavy vaginal bleeding. Pain well controlled. Not yet amublating. Tolerating clear liquid diet, muhammad in place, not yet passing flatus, no BM. Denies headache, changes in vision, chest pain, SOB, RUQ/epigastric pain, N/V, fevers, chills, diarrhea, LE pain/swelling.     Physical Exam  Vital Signs Last 24 Hrs  T(C): 35.9 (03 Jan 2021 13:23), Max: 36.9 (03 Jan 2021 06:08)  T(F): 96.7 (03 Jan 2021 13:23), Max: 98.4 (03 Jan 2021 06:08)  HR: 65 (03 Jan 2021 13:23) (61 - 100)  BP: 98/51 (03 Jan 2021 13:23) (98/51 - 139/81)  RR: 18 (03 Jan 2021 13:23) (18 - 18)  SpO2: 98% (03 Jan 2021 12:40) (92% - 98%)    UO: 30cc from 0016-2175, 20cc/hr, adequate 49cc/hr    Gen: NAD, sitting comfortably  CV: RRR. No murmurs gallops or rubs.  Pulm: CTAB. No wheezes or rales.  Ext: No calf tenderness, no swelling.   Abd: Nondistended, soft, nontender, BS+, fundus firm, and below umbilicus.   Lochia: Minimal rubra  Wound: Pfannenstiel skin incision clean, dry intact. Dermabond in place. No surrounding edema or erythema.      PAST MEDICAL & SURGICAL HISTORY:  No pertinent past medical history    H/O dilation and curettage    Diet: regular    Labs:                        11.4   9.44  )-----------( 246      ( 03 Jan 2021 07:25 )             34.3      RPR NR  O pos, antibody neg    MEDICATIONS  (STANDING):  acetaminophen   Tablet .. 975 milliGRAM(s) Oral <User Schedule>  enoxaparin Injectable 40 milliGRAM(s) SubCutaneous daily  ibuprofen  Tablet. 600 milliGRAM(s) Oral every 6 hours  ketorolac   Injectable 30 milliGRAM(s) IV Push every 6 hours  lactated ringers. 1000 milliLiter(s) (125 mL/Hr) IV Continuous <Continuous>  measles/mumps/rubella Vaccine 0.5 milliLiter(s) SubCutaneous once  oxytocin Infusion 333.333 milliUNIT(s)/Min (1000 mL/Hr) IV Continuous <Continuous>  simethicone 80 milliGRAM(s) Chew every 4 hours    MEDICATIONS  (PRN):  diphenhydrAMINE 25 milliGRAM(s) Oral every 6 hours PRN Pruritus  lanolin Ointment 1 Application(s) Topical every 6 hours PRN Sore Nipples  magnesium hydroxide Suspension 30 milliLiter(s) Oral two times a day PRN Constipation  oxyCODONE    IR 5 milliGRAM(s) Oral every 3 hours PRN Moderate to Severe Pain (4-10)  oxyCODONE    IR 5 milliGRAM(s) Oral once PRN Moderate to Severe Pain (4-10)

## 2021-01-05 NOTE — PROGRESS NOTE ADULT - ATTENDING COMMENTS
I saw patient she is resting and doing well, muhammad just removed will monitor for void difficulty, no CP, no SOB< vomiting ceased and has since tolerated diet, pain controlled, no complaints.  Vitals stable, CBC stable.  Patient encouraged to ambulate, pain/bleeding and infection precautions, continue with routine post-op care- AF
I saw patient at bedside, resting comfortably, no CP, no SOB, no N/V, voiding without difficulty, + flatus, no BM, patient had left leg pain, dopplers done negative, it is stable, no lesions noted.  Pain is controlled and she is ambulating. She has no complaints.  If has BM and vitals stable and patient feeling well possible d/c home today, if not anticipate d/c in AM.  Patient given pain, bleeding and infection precautions and will continue routine post-op care.    Abd: soft, NT, appropriate tenderness incision C/D/I with Dermabond, no erythema, no induration, no evidence of infection    A: s/p P C/S for breech POD#2    P: continue to monitor     encourage ambulation     dulcolax/MOM     MMR     will reassess as clinically indicated

## 2021-01-08 ENCOUNTER — APPOINTMENT (OUTPATIENT)
Dept: ANTEPARTUM | Facility: CLINIC | Age: 31
End: 2021-01-08

## 2021-01-11 DIAGNOSIS — E74.39 OTHER DISORDERS OF INTESTINAL CARBOHYDRATE ABSORPTION: ICD-10-CM

## 2021-01-11 DIAGNOSIS — Z87.440 PERSONAL HISTORY OF URINARY (TRACT) INFECTIONS: ICD-10-CM

## 2021-01-11 DIAGNOSIS — Z3A.39 39 WEEKS GESTATION OF PREGNANCY: ICD-10-CM

## 2021-01-11 DIAGNOSIS — Z23 ENCOUNTER FOR IMMUNIZATION: ICD-10-CM

## 2021-01-11 DIAGNOSIS — Z11.52 ENCOUNTER FOR SCREENING FOR COVID-19: ICD-10-CM

## 2021-01-11 DIAGNOSIS — E66.8 OTHER OBESITY: ICD-10-CM

## 2021-01-11 DIAGNOSIS — B26.9 MUMPS WITHOUT COMPLICATION: ICD-10-CM

## 2021-01-13 ENCOUNTER — RX RENEWAL (OUTPATIENT)
Age: 31
End: 2021-01-13

## 2021-01-13 PROBLEM — Z78.9 OTHER SPECIFIED HEALTH STATUS: Chronic | Status: ACTIVE | Noted: 2021-01-03

## 2021-01-19 ENCOUNTER — APPOINTMENT (OUTPATIENT)
Dept: OBGYN | Facility: CLINIC | Age: 31
End: 2021-01-19
Payer: COMMERCIAL

## 2021-01-19 VITALS
HEART RATE: 57 BPM | WEIGHT: 194 LBS | TEMPERATURE: 97.8 F | DIASTOLIC BLOOD PRESSURE: 97 MMHG | SYSTOLIC BLOOD PRESSURE: 133 MMHG | HEIGHT: 61 IN | BODY MASS INDEX: 36.63 KG/M2

## 2021-01-19 DIAGNOSIS — O99.210 OBESITY COMPLICATING PREGNANCY, UNSPECIFIED TRIMESTER: ICD-10-CM

## 2021-01-19 DIAGNOSIS — Z3A.38 38 WEEKS GESTATION OF PREGNANCY: ICD-10-CM

## 2021-01-19 DIAGNOSIS — Z34.83 ENCOUNTER FOR SUPERVISION OF OTHER NORMAL PREGNANCY, THIRD TRIMESTER: ICD-10-CM

## 2021-01-19 DIAGNOSIS — O43.199 OTHER MALFORMATION OF PLACENTA, UNSPECIFIED TRIMESTER: ICD-10-CM

## 2021-01-19 LAB
ALBUMIN SERPL ELPH-MCNC: 3.9 G/DL
ALP BLD-CCNC: 70 U/L
ALT SERPL-CCNC: 7 U/L
ANION GAP SERPL CALC-SCNC: 14 MMOL/L
APTT BLD: 33.5 SEC
AST SERPL-CCNC: 15 U/L
BASOPHILS # BLD AUTO: 0.04 K/UL
BASOPHILS NFR BLD AUTO: 0.6 %
BILIRUB SERPL-MCNC: 0.3 MG/DL
BUN SERPL-MCNC: 14 MG/DL
CALCIUM SERPL-MCNC: 9.1 MG/DL
CHLORIDE SERPL-SCNC: 103 MMOL/L
CO2 SERPL-SCNC: 24 MMOL/L
CREAT SERPL-MCNC: 0.8 MG/DL
EOSINOPHIL # BLD AUTO: 0.15 K/UL
EOSINOPHIL NFR BLD AUTO: 2.2 %
FIBRINOGEN PPP COAG.DERIVED-MCNC: 398 MG/DL
GLUCOSE SERPL-MCNC: 75 MG/DL
HCT VFR BLD CALC: 37.9 %
HGB BLD-MCNC: 12.3 G/DL
IMM GRANULOCYTES NFR BLD AUTO: 0.3 %
INR PPP: 1.13 RATIO
LDH SERPL-CCNC: 221
LYMPHOCYTES # BLD AUTO: 2.16 K/UL
LYMPHOCYTES NFR BLD AUTO: 31.2 %
MAN DIFF?: NORMAL
MCHC RBC-ENTMCNC: 28.1 PG
MCHC RBC-ENTMCNC: 32.5 G/DL
MCV RBC AUTO: 86.5 FL
MONOCYTES # BLD AUTO: 0.48 K/UL
MONOCYTES NFR BLD AUTO: 6.9 %
NEUTROPHILS # BLD AUTO: 4.08 K/UL
NEUTROPHILS NFR BLD AUTO: 58.8 %
PLATELET # BLD AUTO: 362 K/UL
POTASSIUM SERPL-SCNC: 3.9 MMOL/L
PROT SERPL-MCNC: 6.6 G/DL
PT BLD: 13 SEC
RBC # BLD: 4.38 M/UL
RBC # FLD: 12.5 %
SODIUM SERPL-SCNC: 141 MMOL/L
URATE SERPL-MCNC: 6.7 MG/DL
WBC # FLD AUTO: 6.93 K/UL

## 2021-01-19 PROCEDURE — 99213 OFFICE O/P EST LOW 20 MIN: CPT

## 2021-01-19 PROCEDURE — 99072 ADDL SUPL MATRL&STAF TM PHE: CPT

## 2021-01-19 PROCEDURE — 81003 URINALYSIS AUTO W/O SCOPE: CPT | Mod: QW

## 2021-01-21 LAB
CREAT SPEC-SCNC: 183 MG/DL
CREAT/PROT UR: 0.1 RATIO
PROT UR-MCNC: 22 MG/DLG/24H

## 2021-02-09 ENCOUNTER — RX RENEWAL (OUTPATIENT)
Age: 31
End: 2021-02-09

## 2021-02-09 RX ORDER — MULTIVIT-MIN/FOLIC/VIT K/LYCOP 400-300MCG
500 TABLET ORAL
Qty: 60 | Refills: 0 | Status: ACTIVE | COMMUNITY
Start: 2021-01-13 | End: 1900-01-01

## 2021-02-14 PROBLEM — O99.210 MATERNAL OBESITY, ANTEPARTUM: Status: RESOLVED | Noted: 2020-08-28 | Resolved: 2021-02-14

## 2021-02-14 PROBLEM — Z34.83 ENCOUNTER FOR SUPERVISION OF OTHER NORMAL PREGNANCY, THIRD TRIMESTER: Status: RESOLVED | Noted: 2020-06-29 | Resolved: 2021-02-14

## 2021-02-14 PROBLEM — Z3A.38 PREGNANCY WITH 38 COMPLETED WEEKS GESTATION: Status: RESOLVED | Noted: 2020-12-28 | Resolved: 2021-02-14

## 2021-02-14 PROBLEM — O43.199 PLACENTA SUCCENTURIATA, ANTEPARTUM: Status: RESOLVED | Noted: 2020-08-28 | Resolved: 2021-02-14

## 2021-02-14 NOTE — HISTORY OF PRESENT ILLNESS
[Postpartum Follow Up] : postpartum follow up [Delivery Date: ___] : on [unfilled] [Primary C/S] : delivered by  section [Female] : Delivery History: baby girl [Wt. ___] : weighing [unfilled] [Breastfeeding] : currently nursing [BF with Difficulty] : nursing without difficulty [Resumed Menses] : has not resumed her menses [Resumed [place]] : has not resumed intercourse [Intended Contraception] : Intended Contraception: [IUD] : intrauterine device [Abdominal Pain] : no abdominal pain [Back Pain] : no back pain [Breast Pain] : no breast pain [Chest Pain] : no chest pain [S/Sx PP Depression] : no signs/symptoms of postpartum depression [Heavy Bleeding] : no heavy bleeding [Incisional Drainage] : no incisional drainage [Incisional Pain] : no incisional pain [Leg Pain] : no leg pain [Shortness of Breath] : no shortness of breath [Suicidal Ideation] : no suicidal ideation [Vaginal Discharge] : no vaginal discharge [None] : No associated symptoms are reported [Clean/Dry/Intact] : clean, dry and intact [Erythema] : not erythematous [Swelling] : not swollen [Dehiscence] : not dehisced [No Sign of Infection] : is showing no signs of infection [Excellent Pain Control] : has excellent pain control [FreeTextEntry8] : post op wound check, s/p P C/S for breech presentation [FreeTextEntry9] : uncomplicated pregnancy, trich treatment, declined ECV and was noted to have persistent breech presentation [de-identified] : scheduled elective primary breech presentation [de-identified] : patietn states feeling well, normal bowel and bladder function, pain contorlled, patietn noted to have elevated BP today, strict precautions given, will give rx for cuff to monitor at home and do preeclamptic labs today [de-identified] : dermabond removed and incision cleaned, healing well, no evidence of infection, intact [de-identified] : 29yo s/p P C/S for post op wound check, elevated BP, r/o PP preeclampsia [de-identified] : post op check done - healing well, pain, bleeding, infection and preeclampsia precautions given, home BP monitring, preeclampsia labs, patient wants PP IUD placed for contraception (previously ordered) f/u 2-4 wks or PRN

## 2021-02-16 ENCOUNTER — APPOINTMENT (OUTPATIENT)
Dept: OBGYN | Facility: CLINIC | Age: 31
End: 2021-02-16
Payer: COMMERCIAL

## 2021-02-16 VITALS
DIASTOLIC BLOOD PRESSURE: 96 MMHG | HEIGHT: 61 IN | TEMPERATURE: 97.8 F | SYSTOLIC BLOOD PRESSURE: 127 MMHG | WEIGHT: 194 LBS | HEART RATE: 83 BPM | BODY MASS INDEX: 36.63 KG/M2

## 2021-02-16 DIAGNOSIS — O99.019 ANEMIA COMPLICATING PREGNANCY, UNSPECIFIED TRIMESTER: ICD-10-CM

## 2021-02-16 DIAGNOSIS — N94.9 UNSPECIFIED CONDITION ASSOCIATED WITH FEMALE GENITAL ORGANS AND MENSTRUAL CYCLE: ICD-10-CM

## 2021-02-16 DIAGNOSIS — O23.40 UNSPECIFIED INFECTION OF URINARY TRACT IN PREGNANCY, UNSPECIFIED TRIMESTER: ICD-10-CM

## 2021-02-16 DIAGNOSIS — L29.2 PRURITUS VULVAE: ICD-10-CM

## 2021-02-16 LAB
BILIRUB UR QL STRIP: NEGATIVE
CLARITY UR: NORMAL
COLLECTION METHOD: NORMAL
GLUCOSE UR-MCNC: NEGATIVE
HCG UR QL: 0.2 EU/DL
HCG UR QL: NEGATIVE
HGB UR QL STRIP.AUTO: ABNORMAL
KETONES UR-MCNC: NEGATIVE
LEUKOCYTE ESTERASE UR QL STRIP: ABNORMAL
NITRITE UR QL STRIP: NEGATIVE
PH UR STRIP: 5.5
PROT UR STRIP-MCNC: NEGATIVE
QUALITY CONTROL: YES
SP GR UR STRIP: 1.01

## 2021-02-16 PROCEDURE — 76857 US EXAM PELVIC LIMITED: CPT | Mod: 59

## 2021-02-16 PROCEDURE — 81003 URINALYSIS AUTO W/O SCOPE: CPT | Mod: QW

## 2021-02-16 PROCEDURE — 81025 URINE PREGNANCY TEST: CPT

## 2021-02-16 PROCEDURE — 99072 ADDL SUPL MATRL&STAF TM PHE: CPT

## 2021-02-16 PROCEDURE — 0503F POSTPARTUM CARE VISIT: CPT | Mod: 25

## 2021-02-16 PROCEDURE — 58300 INSERT INTRAUTERINE DEVICE: CPT

## 2021-02-16 PROCEDURE — 76998 US GUIDE INTRAOP: CPT

## 2021-02-17 PROBLEM — L29.2 VULVAR PRURITUS: Status: RESOLVED | Noted: 2020-12-28 | Resolved: 2021-02-17

## 2021-02-17 PROBLEM — O99.019 ANEMIA IN PREGNANCY: Status: RESOLVED | Noted: 2020-09-28 | Resolved: 2021-02-17

## 2021-02-17 PROBLEM — N94.9 ADNEXAL CYST: Status: RESOLVED | Noted: 2020-05-13 | Resolved: 2021-02-17

## 2021-02-17 PROBLEM — O23.40 UTI IN PREGNANCY: Status: RESOLVED | Noted: 2020-12-08 | Resolved: 2021-02-17

## 2021-02-17 NOTE — HISTORY OF PRESENT ILLNESS
[Last Pap Date: ___] : Last Pap Date: [unfilled] [Delivery Date: ___] : on [unfilled] [Primary C/S] : delivered by  section [Female] : Delivery History: baby girl [Wt. ___] : weighing [unfilled] [Resumed Menses] : has resumed her menses [Intended Contraception] : Intended Contraception: [IUD] : intrauterine device [Postpartum Follow Up] : postpartum follow up [Breastfeeding] : not currently nursing [Resumed [place]] : has not resumed intercourse [Abdominal Pain] : no abdominal pain [Back Pain] : no back pain [Breast Pain] : no breast pain [Chest Pain] : no chest pain [Cracked Nipples] : no cracked nipples [S/Sx PP Depression] : no signs/symptoms of postpartum depression [Heavy Bleeding] : no heavy bleeding [Incisional Drainage] : no incisional drainage [Incisional Pain] : no incisional pain [Leg Pain] : no leg pain [Shortness of Breath] : no shortness of breath [Suicidal Ideation] : no suicidal ideation [Vaginal Discharge] : no vaginal discharge [Chills] : no chills [Dysuria] : no dysuria [Fever] : no fever [Headache] : no headache [Nausea] : no nausea [Vomiting] : no vomiting [Clean/Dry/Intact] : clean, dry and intact [Erythema] : not erythematous [Swelling] : not swollen [Dehiscence] : not dehisced [Healed] : healed [Back to Normal] : is back to normal in size [Mild] : mild vaginal bleeding [Normal] : the vagina was normal [Cervix Sample Taken] : cervical sample not taken for a Pap smear [Examination Of The Breasts] : breasts are normal [Awake] : awake [Alert] : alert [Acute Distress] : acute distress [Mass] : no breast mass [Nipple Discharge] : no nipple discharge [Axillary LAD] : no axillary lymphadenopathy [Soft] : soft [Tender] : non tender [Distended] : not distended [Oriented x3] : oriented to person, place, and time [Depressed Mood] : not depressed [Doing Well] : is doing well [No Sign of Infection] : is showing no signs of infection [Excellent Pain Control] : has excellent pain control [FreeTextEntry8] : PP and post op exam and would like IUD insertion for contraception, BP check [FreeTextEntry9] : breech presentation, trichomonas with treatment and cure noted [de-identified] : scheduled P C/S for breech presentation, uncomplicated [de-identified] : Stable, no pain, states normal normal bowel and bladder function, she has been monitrng BP at home since post-op check reveled elevated BP, preeclamptic labs were WNL, she states BP at home has been 120s/90s and asx [de-identified] : 29yo for PP exam, po-op exam- s/p P C/S healing well, contraception mgmt, elevated BP, BMI 36 [de-identified] : PP and post op exam done, safe sex practices discussed, pain and bleeding precautions, contraception counseling done, Paragard IUD inserted without complication, encourage home BP monitoring and PCP follow up, encourage healthy diet and exercise, f/u when not menstruating for routine pap smear or PRN

## 2021-02-17 NOTE — ASSESSMENT
[FreeTextEntry1] : GC/CT cultures:                                                                                                                                                                                                                                                                              Urine pregnancy:NEG                                                                                                                                                                                                                     \par A time out was completed, verifying correct patient, procedure, site, positioning, and implant(s) or special equipment.                                                                                                                                                                                                \par INDICATIONS FOR PROCEDURE: This patient is a 30 year female who presents for her IUD insertion.                                          The patient understands the risks and benefits of intrauterine device insertion and agrees to proceed.      \par PROCEDURE AND FINDINGS: After appropriate discussion of risks and benefits of IUD placement, written informed consent was obtained. Urine pregnancy test was negative. A bimanual exam revealed normal size and position of the uterus. The patient was placed in the dorsal lithotomy position, and a speculum was inserted. The cervix was visualized and prepped with Iodine. A allis clamp was applied to the anterior lip of the cervix. A sound was placed through the cervical os in sterile fashion, and the uterus sounded to 5cm, attempted a few times with resistance, therefore a sonogram was used to guide past resistance of presumed  scar ridge. Then sound was noted to be 9cm. IUD was loaded in the applicator in the usual fashion and the indicator placed according to the sound. The applicator was inserted into the cervix and the intrauterine device placed high in the endometrial cavity. The applicator was withdrawn and the strings trimmed. The patient tolerated the procedure well with no complications.                                                                                                                       \par FOLLOW UP: Patient was counseled regarding techniques for self-monitoring of IUD and given precautions. Patient notified of removal date. \par

## 2021-02-17 NOTE — PROCEDURE
[IUD Placement] : intrauterine device (IUD) placement [Prevention of Pregnancy] : prevention of pregnancy [Risks] : risks [Benefits] : benefits [Alternatives] : alternatives [Patient] : patient [Neg Pregnancy Test] : negative pregnancy test [Ibuprofen ___ mg] : ibuprofen [unfilled] ~Umg [ParaGard IUD] : ParaGard IUD [Consent Obtained] : Consent obtained [Infection] : infection [Pain] : pain [Bleeding] : bleeding [Expulsion] : expulsion [Failure] : failure [Uterine Perforation] : uterine perforation [Betadine] : Betadine [Easy Passage] : Easy passage [Sounded to ___ cm] : sounded to [unfilled] ~Ucm [US Guidance] : US Guidance [Tolerated Well] : Patient tolerated the procedure well [No Complications] : No complications [None] : None [de-identified] : negative genital cultures in third trimester, no intercourse since culture done [LMPDate] : 02/13/21 [de-identified] : MAXIMINO CLAMP [de-identified] : uterus initially sounded to 5cm which was not thought to be accurate, so sonogram was used to guide insertion where the ridge that was blocking noted and actual sound was 9cm [de-identified] : [id number] [de-identified] : APRIL 2026 [de-identified] : 2/2031

## 2021-02-17 NOTE — HISTORY OF PRESENT ILLNESS
[Last Pap Date: ___] : Last Pap Date: [unfilled] [Delivery Date: ___] : on [unfilled] [Primary C/S] : delivered by  section [Female] : Delivery History: baby girl [Wt. ___] : weighing [unfilled] [Resumed Menses] : has resumed her menses [Intended Contraception] : Intended Contraception: [IUD] : intrauterine device [Postpartum Follow Up] : postpartum follow up [Breastfeeding] : not currently nursing [Resumed [place]] : has not resumed intercourse [Abdominal Pain] : no abdominal pain [Back Pain] : no back pain [Breast Pain] : no breast pain [Chest Pain] : no chest pain [Cracked Nipples] : no cracked nipples [S/Sx PP Depression] : no signs/symptoms of postpartum depression [Heavy Bleeding] : no heavy bleeding [Incisional Drainage] : no incisional drainage [Incisional Pain] : no incisional pain [Leg Pain] : no leg pain [Shortness of Breath] : no shortness of breath [Suicidal Ideation] : no suicidal ideation [Vaginal Discharge] : no vaginal discharge [Chills] : no chills [Dysuria] : no dysuria [Fever] : no fever [Headache] : no headache [Nausea] : no nausea [Vomiting] : no vomiting [Clean/Dry/Intact] : clean, dry and intact [Erythema] : not erythematous [Swelling] : not swollen [Dehiscence] : not dehisced [Healed] : healed [Back to Normal] : is back to normal in size [Mild] : mild vaginal bleeding [Normal] : the vagina was normal [Cervix Sample Taken] : cervical sample not taken for a Pap smear [Examination Of The Breasts] : breasts are normal [Awake] : awake [Alert] : alert [Acute Distress] : acute distress [Mass] : no breast mass [Nipple Discharge] : no nipple discharge [Axillary LAD] : no axillary lymphadenopathy [Soft] : soft [Tender] : non tender [Distended] : not distended [Oriented x3] : oriented to person, place, and time [Depressed Mood] : not depressed [Doing Well] : is doing well [No Sign of Infection] : is showing no signs of infection [Excellent Pain Control] : has excellent pain control [FreeTextEntry8] : PP and post op exam and would like IUD insertion for contraception, BP check [FreeTextEntry9] : breech presentation, trichomonas with treatment and cure noted [de-identified] : scheduled P C/S for breech presentation, uncomplicated [de-identified] : Stable, no pain, states normal normal bowel and bladder function, she has been monitrng BP at home since post-op check reveled elevated BP, preeclamptic labs were WNL, she states BP at home has been 120s/90s and asx [de-identified] : 31yo for PP exam, po-op exam- s/p P C/S healing well, contraception mgmt, elevated BP, BMI 36 [de-identified] : PP and post op exam done, safe sex practices discussed, pain and bleeding precautions, contraception counseling done, Paragard IUD inserted without complication, encourage home BP monitoring and PCP follow up, encourage healthy diet and exercise, f/u when not menstruating for routine pap smear or PRN

## 2021-02-17 NOTE — PROCEDURE
[IUD Placement] : intrauterine device (IUD) placement [Prevention of Pregnancy] : prevention of pregnancy [Risks] : risks [Benefits] : benefits [Alternatives] : alternatives [Patient] : patient [Neg Pregnancy Test] : negative pregnancy test [Ibuprofen ___ mg] : ibuprofen [unfilled] ~Umg [ParaGard IUD] : ParaGard IUD [Consent Obtained] : Consent obtained [Infection] : infection [Bleeding] : bleeding [Pain] : pain [Expulsion] : expulsion [Failure] : failure [Uterine Perforation] : uterine perforation [Betadine] : Betadine [Easy Passage] : Easy passage [Sounded to ___ cm] : sounded to [unfilled] ~Ucm [US Guidance] : US Guidance [Tolerated Well] : Patient tolerated the procedure well [No Complications] : No complications [None] : None [de-identified] : negative genital cultures in third trimester, no intercourse since culture done [LMPDate] : 02/13/21 [de-identified] : MAXIMINO CLAMP [de-identified] : uterus initially sounded to 5cm which was not thought to be accurate, so sonogram was used to guide insertion where the ridge that was blocking noted and actual sound was 9cm [de-identified] : [id number] [de-identified] : APRIL 2026 [de-identified] : 2/2031

## 2021-06-22 ENCOUNTER — RX RENEWAL (OUTPATIENT)
Age: 31
End: 2021-06-22

## 2021-06-25 ENCOUNTER — APPOINTMENT (OUTPATIENT)
Dept: OBGYN | Facility: CLINIC | Age: 31
End: 2021-06-25
Payer: COMMERCIAL

## 2021-06-25 ENCOUNTER — ASOB RESULT (OUTPATIENT)
Age: 31
End: 2021-06-25

## 2021-06-25 ENCOUNTER — APPOINTMENT (OUTPATIENT)
Dept: OBGYN | Facility: CLINIC | Age: 31
End: 2021-06-25
Payer: SELF-PAY

## 2021-06-25 VITALS
BODY MASS INDEX: 34.74 KG/M2 | WEIGHT: 184 LBS | SYSTOLIC BLOOD PRESSURE: 142 MMHG | DIASTOLIC BLOOD PRESSURE: 87 MMHG | TEMPERATURE: 98 F | HEIGHT: 61 IN | HEART RATE: 68 BPM

## 2021-06-25 DIAGNOSIS — R03.0 ELEVATED BLOOD-PRESSURE READING, W/OUT DIAGNOSIS OF HYPERTENSION: ICD-10-CM

## 2021-06-25 DIAGNOSIS — Z48.89 ENCOUNTER FOR OTHER SPECIFIED SURGICAL AFTERCARE: ICD-10-CM

## 2021-06-25 DIAGNOSIS — Z98.891 HISTORY OF UTERINE SCAR FROM PREVIOUS SURGERY: ICD-10-CM

## 2021-06-25 LAB
BILIRUB UR QL STRIP: NORMAL
CLARITY UR: CLEAR
COLLECTION METHOD: NORMAL
GLUCOSE UR-MCNC: NORMAL
HCG UR QL: 0.2 EU/DL
HGB UR QL STRIP.AUTO: NORMAL
KETONES UR-MCNC: NORMAL
LEUKOCYTE ESTERASE UR QL STRIP: ABNORMAL
NITRITE UR QL STRIP: NORMAL
PH UR STRIP: 6.5
PROT UR STRIP-MCNC: NORMAL
SP GR UR STRIP: <=1.005

## 2021-06-25 PROCEDURE — 81003 URINALYSIS AUTO W/O SCOPE: CPT | Mod: QW

## 2021-06-25 PROCEDURE — 99395 PREV VISIT EST AGE 18-39: CPT

## 2021-06-25 PROCEDURE — ZZZZZ: CPT

## 2021-06-25 PROCEDURE — 76830 TRANSVAGINAL US NON-OB: CPT

## 2021-06-27 PROBLEM — R03.0 ELEVATED BP WITHOUT DIAGNOSIS OF HYPERTENSION: Status: ACTIVE | Noted: 2020-05-13

## 2021-06-27 PROBLEM — Z98.891 STATUS POST CESAREAN SECTION: Status: RESOLVED | Noted: 2021-02-14 | Resolved: 2021-06-27

## 2021-06-27 PROBLEM — Z48.89 ENCOUNTER FOR POSTOPERATIVE WOUND CHECK: Status: RESOLVED | Noted: 2021-02-14 | Resolved: 2021-06-27

## 2021-06-27 LAB — BACTERIA UR CULT: NORMAL

## 2021-06-27 RX ORDER — OXYCODONE AND ACETAMINOPHEN 5; 325 MG/1; MG/1
5-325 TABLET ORAL
Qty: 10 | Refills: 0 | Status: DISCONTINUED | COMMUNITY
Start: 2021-01-05

## 2021-06-27 NOTE — DISCUSSION/SUMMARY
[FreeTextEntry1] : TVS done to assess for recent pelvic pain, IUD location.  Patient counseled on findings of uterus normal with IUD appearing well placed.  Both ovaries WNL, but right ovary and culdesac with trace fluid indicating possible recent ovulation or cyst rupture, doppler negative.  Good flow to ovaries, no suspicion of torsion.  Patient given strict pain and bleeding precautions if worsens or new sx arise to go to ED, pain mgmt PRN.  Patient understands and all questions answered satisfactorily. \par \par A: 31yo for annual GYN exam, pelvic pain, IUD, elevated BP (suspected CHTN), BMI 34\par \par P: GYN Exam done\par     pap smear done, UCx\par     safe sex practices discussed\par     IUD in place- contraception counseling\par     TVS done\par     pain and bleeding precautions\par     naproxen PRN\par     encourage healthy diet and exercise\par     monitor BP and f/u PCP for BP mgmt\par     f/u 1 yr or PRN

## 2021-06-27 NOTE — HISTORY OF PRESENT ILLNESS
[Patient reported PAP Smear was normal] : Patient reported PAP Smear was normal [N] : Patient reports normal menses [Normal Amount/Duration] :  normal amount and duration [Regular Cycle Intervals] : periods have been regular [Frequency: Q ___ days] : menstrual periods occur approximately every [unfilled] days [Menarche Age: ____] : age at menarche was [unfilled] [Currently Active] : currently active [Men] : men [No] : No [HIV test declined] : HIV test declined [Syphilis test declined] : Syphilis test declined [Gonorrhea test offered] : Gonorrhea test offered [Chlamydia test offered] : Chlamydia test offered [Trichomonas test offered] : Trichomonas test offered [HPV test offered] : HPV test offered [Hepatitis B test declined] : Hepatitis B test declined [Hepatitis C test declined] : Hepatitis C test declined [Y] : Patient uses contraception [IUD] : has an intrauterine device [TextBox_4] : 31yo  with LMP 6/11/2021 came for annual GYN exam and pap smear, but also states having pelvic pain for the last couple days, intermittent, no alleviating or exacerbating factors, no N/V, no AUB, discharge or dysuria.  She is sexually active with one male partner- no condoms, Paragard IUD for contraception. She denies h/o abnl pap, has h/o HSV, trich, no fibroids or cysts.  [PapSmeardate] : 2019 [LMPDate] : 6/11/21 [MensesFreq] : 28 [MensesLength] : 5 [de-identified] : Paragard [MensesAmount] : NL flow  [PGHxTotal] : 4 [[facility]] : 3 [PGHxPremature] : 0 [PGHxAbortions] : 2 [[facility] Hospitaliving] : 3 [PGHxABInduced] : 2 [PGHxEctopic] : 0 [PGHxABSpont] : 0 [PGHxMultBirths] : 0 [FreeTextEntry1] : 6/11/21 [FreeTextEntry3] : IUD

## 2021-06-27 NOTE — PHYSICAL EXAM
[Appropriately responsive] : appropriately responsive [Alert] : alert [No Acute Distress] : no acute distress [No Lymphadenopathy] : no lymphadenopathy [Regular Rate Rhythm] : regular rate rhythm [Soft] : soft [Non-tender] : non-tender [Non-distended] : non-distended [No Mass] : no mass [Oriented x3] : oriented x3 [Examination Of The Breasts] : a normal appearance [No Discharge] : no discharge [No Masses] : no breast masses were palpable [No Lesions] : no lesions  [Labia Majora] : normal [Labia Minora] : normal [Normal] : normal [No Bleeding] : There was no active vaginal bleeding [IUD String] : an IUD string was noted [Tenderness] : nontender [Enlarged ___ wks] : not enlarged [Mass ___ cm] : no uterine mass was palpated [Uterine Adnexae] : normal [FreeTextEntry5] : pap smear done, no CMT [FreeTextEntry6] : no pain reproduced on deep palpation, exam limited due to habitus

## 2021-06-27 NOTE — COUNSELING
[Nutrition/ Exercise/ Weight Management] : nutrition, exercise, weight management [Vitamins/Supplements] : vitamins/supplements [Breast Self Exam] : breast self exam [Bladder Hygiene] : bladder hygiene [Contraception/ Emergency Contraception/ Safe Sexual Practices] : contraception, emergency contraception, safe sexual practices [STD (testing, results, tx)] : STD (testing, results, tx) [Medication Management] : medication management [Lab Results] : lab results

## 2021-06-30 LAB
C TRACH RRNA SPEC QL NAA+PROBE: NOT DETECTED
HPV HIGH+LOW RISK DNA PNL CVX: NOT DETECTED
N GONORRHOEA RRNA SPEC QL NAA+PROBE: NOT DETECTED
SOURCE AMPLIFICATION: NORMAL
SOURCE AMPLIFICATION: NORMAL
T VAGINALIS RRNA SPEC QL NAA+PROBE: NOT DETECTED

## 2021-07-07 LAB — CYTOLOGY CVX/VAG DOC THIN PREP: NORMAL

## 2021-12-20 NOTE — OB RN PATIENT PROFILE - MATERNAL MARITAL STATUS, OB PROFILE
Well appearing, awake, alert, oriented to person, place, time/situation and in no apparent distress. normal... partnered

## 2022-04-01 RX ORDER — ACYCLOVIR 50 MG/G
5 OINTMENT TOPICAL 3 TIMES DAILY
Qty: 1 | Refills: 2 | Status: ACTIVE | COMMUNITY
Start: 2022-04-01 | End: 1900-01-01

## 2022-06-27 ENCOUNTER — APPOINTMENT (OUTPATIENT)
Dept: OBGYN | Facility: CLINIC | Age: 32
End: 2022-06-27

## 2022-08-15 ENCOUNTER — APPOINTMENT (OUTPATIENT)
Dept: OBGYN | Facility: CLINIC | Age: 32
End: 2022-08-15

## 2022-08-15 VITALS
BODY MASS INDEX: 28.7 KG/M2 | TEMPERATURE: 97.8 F | SYSTOLIC BLOOD PRESSURE: 128 MMHG | WEIGHT: 152 LBS | HEIGHT: 61 IN | HEART RATE: 60 BPM | DIASTOLIC BLOOD PRESSURE: 83 MMHG

## 2022-08-15 DIAGNOSIS — R10.2 PELVIC AND PERINEAL PAIN: ICD-10-CM

## 2022-08-15 PROCEDURE — 99395 PREV VISIT EST AGE 18-39: CPT

## 2022-08-15 RX ORDER — MELOXICAM 7.5 MG/1
7.5 TABLET ORAL
Qty: 10 | Refills: 0 | Status: ACTIVE | COMMUNITY
Start: 2022-04-01

## 2022-08-15 RX ORDER — ACYCLOVIR 50 MG/G
5 CREAM TOPICAL
Qty: 1 | Refills: 5 | Status: ACTIVE | COMMUNITY
Start: 2022-08-15 | End: 1900-01-01

## 2022-08-15 RX ORDER — VALACYCLOVIR 500 MG/1
500 TABLET, FILM COATED ORAL
Qty: 14 | Refills: 5 | Status: ACTIVE | COMMUNITY
Start: 2022-08-15 | End: 1900-01-01

## 2022-08-15 RX ORDER — AMOXICILLIN AND CLAVULANATE POTASSIUM 875; 125 MG/1; MG/1
875-125 TABLET, COATED ORAL
Qty: 20 | Refills: 0 | Status: COMPLETED | COMMUNITY
Start: 2022-03-28

## 2022-08-15 NOTE — PHYSICAL EXAM
[Appropriately responsive] : appropriately responsive [Alert] : alert [No Acute Distress] : no acute distress [No Lymphadenopathy] : no lymphadenopathy [Regular Rate Rhythm] : regular rate rhythm [Soft] : soft [Non-tender] : non-tender [Non-distended] : non-distended [No Mass] : no mass [Oriented x3] : oriented x3 [Examination Of The Breasts] : a normal appearance [No Discharge] : no discharge [No Masses] : no breast masses were palpable [No Lesions] : no lesions  [Labia Majora] : normal [Labia Minora] : normal [Normal] : normal [No Bleeding] : There was no active vaginal bleeding [IUD String] : an IUD string was noted [Tenderness] : nontender [Enlarged ___ wks] : not enlarged [Mass ___ cm] : no uterine mass was palpated [Uterine Adnexae] : normal [FreeTextEntry5] : Pap smear done

## 2022-08-15 NOTE — HISTORY OF PRESENT ILLNESS
[Patient reported PAP Smear was normal] : Patient reported PAP Smear was normal [IUD] : has an intrauterine device [Y] : Positive pregnancy history [Normal Amount/Duration] :  normal amount and duration [Regular Cycle Intervals] : periods have been regular [Frequency: Q ___ days] : menstrual periods occur approximately every [unfilled] days [Menarche Age: ____] : age at menarche was [unfilled] [Currently Active] : currently active [Men] : men [No] : No [Yes] : Yes [Gonorrhea test offered] : Gonorrhea test offered [Chlamydia test offered] : Chlamydia test offered [Trichomonas test offered] : Trichomonas test offered [HPV test offered] : HPV test offered [TextBox_4] : 31-year-old para 3-0-2-3 with LMP 8/3/2022 came for annual GYN exam and Pap smear.  Patient denies abnormal uterine bleeding, pelvic pain, discharge, dysuria or other GYN complaints.  She has ParaGard IUD in situ for contraception.  She does state that she gets occasional HSV outbreaks and would like refill of medication.  She denies current symptoms.  She has history of abnormal Pap, HPV or fibroids.  She has a known history of HSV and a remote history of trichomonas that was treated in the last pregnancy.  She does have history of ovarian cyst but is asymptomatic.  She is sexually active with 1 male partner. [PapSmeardate] : 6/2021 [LMPDate] : 8/3/22 [MensesFreq] : 28 [MensesLength] : 5 [MensesAmount] : heavy  [PGHxTotal] : 5 [[facility]Term] : 3 [PGHxPremature] : 0 [[facility] Centeriving] : 3 [PGHxAbortions] : 2 [PGHxABInduced] : 2 [PGHxABSpont] : 0 [PGHxEctopic] : 0 [PGHxMultBirths] : 0 [FreeTextEntry1] : 8/3/22 [FreeTextEntry3] : IUD-ParaGard

## 2022-08-15 NOTE — DISCUSSION/SUMMARY
[FreeTextEntry1] : A: 31-year-old for annual GYN exam, IUD in situ, BMI 28\par \par P: GYN exam done\par Pap smear done\par Safe sex practices\par Pain and bleeding precautions\par Contraception counseling done-patient wants to continue IUD use\par Encourage healthy diet and exercise\par Follow-up for routine exam or as needed\par

## 2022-08-30 LAB — CYTOLOGY CVX/VAG DOC THIN PREP: ABNORMAL

## 2022-08-31 ENCOUNTER — NON-APPOINTMENT (OUTPATIENT)
Age: 32
End: 2022-08-31

## 2023-01-19 RX ORDER — VALACYCLOVIR 500 MG/1
500 TABLET, FILM COATED ORAL
Qty: 14 | Refills: 5 | Status: ACTIVE | COMMUNITY
Start: 2023-01-19 | End: 1900-01-01

## 2023-01-19 RX ORDER — ACYCLOVIR 50 MG/G
5 OINTMENT TOPICAL 3 TIMES DAILY
Qty: 1 | Refills: 2 | Status: ACTIVE | COMMUNITY
Start: 2023-01-19 | End: 1900-01-01

## 2023-02-10 ENCOUNTER — APPOINTMENT (OUTPATIENT)
Dept: OBGYN | Facility: CLINIC | Age: 33
End: 2023-02-10
Payer: COMMERCIAL

## 2023-02-10 VITALS
BODY MASS INDEX: 28.32 KG/M2 | DIASTOLIC BLOOD PRESSURE: 72 MMHG | WEIGHT: 150 LBS | HEART RATE: 80 BPM | HEIGHT: 61 IN | TEMPERATURE: 97.9 F | SYSTOLIC BLOOD PRESSURE: 111 MMHG

## 2023-02-10 DIAGNOSIS — B00.9 HERPESVIRAL INFECTION, UNSPECIFIED: ICD-10-CM

## 2023-02-10 DIAGNOSIS — N89.8 OTHER SPECIFIED NONINFLAMMATORY DISORDERS OF VAGINA: ICD-10-CM

## 2023-02-10 PROCEDURE — 17250 CHEM CAUT OF GRANLTJ TISSUE: CPT

## 2023-02-10 PROCEDURE — 56605 BIOPSY OF VULVA/PERINEUM: CPT

## 2023-02-10 PROCEDURE — 99214 OFFICE O/P EST MOD 30 MIN: CPT | Mod: 25

## 2023-02-16 LAB
CORE LAB BIOPSY: NORMAL
CYTOLOGY CVX/VAG DOC THIN PREP: NORMAL
HPV HIGH+LOW RISK DNA PNL CVX: NOT DETECTED

## 2023-02-21 LAB
A VAGINAE DNA VAG QL NAA+PROBE: NORMAL
BVAB2 DNA VAG QL NAA+PROBE: NORMAL
C KRUSEI DNA VAG QL NAA+PROBE: NEGATIVE
C KRUSEI DNA VAG QL NAA+PROBE: POSITIVE
C TRACH RRNA SPEC QL NAA+PROBE: NEGATIVE
MEGA1 DNA VAG QL NAA+PROBE: NORMAL
N GONORRHOEA RRNA SPEC QL NAA+PROBE: NEGATIVE
T VAGINALIS RRNA SPEC QL NAA+PROBE: NEGATIVE

## 2023-02-23 RX ORDER — FLUCONAZOLE 150 MG/1
150 TABLET ORAL
Qty: 1 | Refills: 0 | Status: ACTIVE | COMMUNITY
Start: 2023-02-23 | End: 1900-01-01

## 2023-03-09 ENCOUNTER — APPOINTMENT (OUTPATIENT)
Dept: OBGYN | Facility: CLINIC | Age: 33
End: 2023-03-09
Payer: COMMERCIAL

## 2023-03-09 VITALS
TEMPERATURE: 97.9 F | BODY MASS INDEX: 26.24 KG/M2 | HEART RATE: 74 BPM | SYSTOLIC BLOOD PRESSURE: 116 MMHG | HEIGHT: 61 IN | WEIGHT: 139 LBS | DIASTOLIC BLOOD PRESSURE: 83 MMHG

## 2023-03-09 LAB
HCG UR QL: NEGATIVE
QUALITY CONTROL: YES

## 2023-03-09 PROCEDURE — 99213 OFFICE O/P EST LOW 20 MIN: CPT

## 2023-03-09 PROCEDURE — 81025 URINE PREGNANCY TEST: CPT

## 2023-03-09 RX ORDER — IMIQUIMOD 50 MG/G
5 CREAM TOPICAL
Qty: 1 | Refills: 1 | Status: ACTIVE | COMMUNITY
Start: 2023-03-09 | End: 1900-01-01

## 2023-03-15 NOTE — PHYSICAL EXAM
[Appropriately responsive] : appropriately responsive [Alert] : alert [No Acute Distress] : no acute distress [Regular Rate Rhythm] : regular rate rhythm [Oriented x3] : oriented x3 [Normal] : normal [No Bleeding] : There was no active vaginal bleeding

## 2023-03-15 NOTE — DISCUSSION/SUMMARY
[FreeTextEntry1] : Patient counseled extensively on exam findings and management for the vulvar/perineal lesions.  Due to the extent of the lesions surgical extension would be extensive and not recommended to do in the office.  Due to patient's concern and note of recurrence in area of previous excision/biopsy we discussed trying medical management.  The risk/benefits/alternatives of all medical and surgical options were discussed and patient understood all counseling and all questions answered satisfactorily.  Patient states that she would like to try topical imiquimod.  The risks and benefits discussed and the potential side effects counseled.  Patient was instructed the recommendation is to use 3 times a day for 16 weeks and can reevaluate if there is any residual liver lesions may need surgical management at that time if still bothersome.  Patient again understood all counseling and all questions answered satisfactorily\par \par A: 32-year-old with condyloma acuminata\par \par P: Limited pelvic exam done\par Safe sex practices\par Pain and bleeding precautions\par Imiquimod Rx sent\par Encourage healthy diet and exercise\par Follow-up 4 months after complete management course or as needed

## 2023-03-15 NOTE — HISTORY OF PRESENT ILLNESS
[Normal Amount/Duration] :  normal amount and duration [Regular Cycle Intervals] : periods have been regular [Frequency: Q ___ days] : menstrual periods occur approximately every [unfilled] days [Menarche Age: ____] : age at menarche was [unfilled] [Currently Active] : currently active [Men] : men [No] : No [Yes] : Yes [TextBox_4] : 32-year-old para 3-0-2-3 came for reevaluation of perennial/vulvar lesions after recent diagnosis of condyloma acuminatum.  Patient states that the lesions are bothersome and she feels that after the biopsy they grew back.  She states discomfort but no significant pain, abnormal bleeding or abnormal discharge.  She was recently treated for Candida infection. [FreeTextEntry1] : 2/27/23 [FreeTextEntry3] : IUD

## 2023-03-15 NOTE — COUNSELING
[Nutrition/ Exercise/ Weight Management] : nutrition, exercise, weight management [Vitamins/Supplements] : vitamins/supplements [Bladder Hygiene] : bladder hygiene [Contraception/ Emergency Contraception/ Safe Sexual Practices] : contraception, emergency contraception, safe sexual practices [STD (testing, results, tx)] : STD (testing, results, tx) [Medication Management] : medication management [Lab Results] : lab results [Pre/Post Op Instructions] : pre/post op instructions

## 2023-06-07 NOTE — DISCUSSION/SUMMARY
[FreeTextEntry1] : A: 32-year-old with vaginal discharge/irritation, vulvar lesion, HSV, BMI 26\par \par P: GYN exam done\par Pap smear done\par Vulvar biopsy done\par Genital culture done\par Safe sex practices\par Pain and bleeding precautions\par Wound care to counseled\par Valtrex and Zovirax cream as needed\par Contraception counseling done-patient related to continue IUD use\par Encourage healthy diet and activity\par Follow-up after pathology and test results or as needed

## 2023-06-07 NOTE — COUNSELING
[Nutrition/ Exercise/ Weight Management] : nutrition, exercise, weight management [Vitamins/Supplements] : vitamins/supplements [Bladder Hygiene] : bladder hygiene [Contraception/ Emergency Contraception/ Safe Sexual Practices] : contraception, emergency contraception, safe sexual practices [STD (testing, results, tx)] : STD (testing, results, tx) [Lab Results] : lab results [Medication Management] : medication management [Pre/Post Op Instructions] : pre/post op instructions

## 2023-06-07 NOTE — PHYSICAL EXAM
[Chaperone Present] : A chaperone was present in the examining room during all aspects of the physical examination [Appropriately responsive] : appropriately responsive [Alert] : alert [No Acute Distress] : no acute distress [Regular Rate Rhythm] : regular rate rhythm [Soft] : soft [Non-tender] : non-tender [Non-distended] : non-distended [No Lesions] : no lesions [No Mass] : no mass [Oriented x3] : oriented x3 [Labia Majora] : normal [Labia Minora] : normal [Normal] : normal [No Bleeding] : There was no active vaginal bleeding [IUD String] : an IUD string was noted [Tenderness] : nontender [Enlarged ___ wks] : not enlarged [Mass ___ cm] : no uterine mass was palpated [Uterine Adnexae] : normal [FreeTextEntry4] : Minimal white discharge-genital culture done [FreeTextEntry5] : Pap smear done

## 2023-06-07 NOTE — PROCEDURE
[Vulvar Biopsy] : Vulvar Biopsy [Time out performed] : Pre-procedure time out performed.  Patient's name, date of birth and procedure confirmed. [Consent Obtained] : Consent obtained [____ Lidocaine w/o Epi] : ~VmL lidocaine without epinephrine [Betadine] : Betadine [Sent to Pathology] : placed in buffered formalin and sent for pathology [Punch] : punch biopsy [Emmanuel's] : Emmanuel's solution [Silver Nitrate] : silver nitrate [Pressure] : the application of direct pressure [Tolerated Well] : the patient tolerated the procedure well [No Complications] : there were no complications

## 2023-06-07 NOTE — HISTORY OF PRESENT ILLNESS
[Normal Amount/Duration] :  normal amount and duration [Regular Cycle Intervals] : periods have been regular [Frequency: Q ___ days] : menstrual periods occur approximately every [unfilled] days [Menarche Age: ____] : age at menarche was [unfilled] [Currently Active] : currently active [Men] : men [No] : No [Yes] : Yes [Gonorrhea test offered] : Gonorrhea test offered [Chlamydia test offered] : Chlamydia test offered [Trichomonas test offered] : Trichomonas test offered [HPV test offered] : HPV test offered [Y] : Patient is sexually active [TextBox_4] : 32-year-old para 3 with LMP 1/20/2023 came for repeat Pap smear due to unsatisfactory results at her previous exam.  Patient also states that she feels that she has been having a recurrent or persistent herpes outbreak and feels some lumps and bumps on the outer labia as well.  She also has complaint of some discharge but no odor but having itching and irritation.  She is sexually active with 1 male partner and uses IUD for contraception. [PapSmeardate] : 2022 [TextBox_31] : Unsatisfactory [LMPDate] : 1/20/2023 [FreeTextEntry1] : 1/20/23 [FreeTextEntry3] : IUD

## 2023-06-19 RX ORDER — FLUCONAZOLE 150 MG/1
150 TABLET ORAL
Qty: 1 | Refills: 1 | Status: ACTIVE | COMMUNITY
Start: 2023-06-19 | End: 1900-01-01

## 2023-07-21 ENCOUNTER — APPOINTMENT (OUTPATIENT)
Dept: OBGYN | Facility: CLINIC | Age: 33
End: 2023-07-21
Payer: COMMERCIAL

## 2023-07-21 VITALS
TEMPERATURE: 98.6 F | HEIGHT: 61 IN | DIASTOLIC BLOOD PRESSURE: 76 MMHG | BODY MASS INDEX: 26.43 KG/M2 | SYSTOLIC BLOOD PRESSURE: 122 MMHG | WEIGHT: 140 LBS | HEART RATE: 70 BPM

## 2023-07-21 PROCEDURE — 90471 IMMUNIZATION ADMIN: CPT

## 2023-07-21 PROCEDURE — 90651 9VHPV VACCINE 2/3 DOSE IM: CPT

## 2023-07-21 PROCEDURE — 99214 OFFICE O/P EST MOD 30 MIN: CPT | Mod: 25

## 2023-07-21 PROCEDURE — 81025 URINE PREGNANCY TEST: CPT

## 2023-07-21 PROCEDURE — 81003 URINALYSIS AUTO W/O SCOPE: CPT | Mod: QW

## 2023-07-21 RX ORDER — FLUCONAZOLE 150 MG/1
150 TABLET ORAL
Qty: 1 | Refills: 1 | Status: ACTIVE | COMMUNITY
Start: 2023-07-21 | End: 1900-01-01

## 2023-07-21 RX ORDER — CLOTRIMAZOLE AND BETAMETHASONE DIPROPIONATE 10; .5 MG/G; MG/G
1-0.05 CREAM TOPICAL TWICE DAILY
Qty: 1 | Refills: 1 | Status: ACTIVE | COMMUNITY
Start: 2023-07-21 | End: 1900-01-01

## 2023-07-21 RX ORDER — SULFAMETHOXAZOLE AND TRIMETHOPRIM 800; 160 MG/1; MG/1
800-160 TABLET ORAL TWICE DAILY
Qty: 14 | Refills: 0 | Status: ACTIVE | COMMUNITY
Start: 2023-07-21 | End: 1900-01-01

## 2023-07-23 LAB
BILIRUB UR QL STRIP: NORMAL
CLARITY UR: CLEAR
COLLECTION METHOD: NORMAL
GLUCOSE UR-MCNC: NORMAL
HCG UR QL: 0.2 EU/DL
HCG UR QL: NEGATIVE
HGB UR QL STRIP.AUTO: ABNORMAL
KETONES UR-MCNC: NORMAL
LEUKOCYTE ESTERASE UR QL STRIP: ABNORMAL
NITRITE UR QL STRIP: NORMAL
PH UR STRIP: 6
PROT UR STRIP-MCNC: ABNORMAL
QUALITY CONTROL: YES
SP GR UR STRIP: 1.02

## 2023-07-25 LAB — BACTERIA UR CULT: ABNORMAL

## 2023-07-26 LAB
A VAGINAE DNA VAG QL NAA+PROBE: NORMAL
BVAB2 DNA VAG QL NAA+PROBE: NORMAL
C KRUSEI DNA VAG QL NAA+PROBE: NEGATIVE
C KRUSEI DNA VAG QL NAA+PROBE: POSITIVE
C TRACH RRNA SPEC QL NAA+PROBE: NEGATIVE
CANDIDA DNA VAG QL NAA+PROBE: NEGATIVE
MEGA1 DNA VAG QL NAA+PROBE: NORMAL
N GONORRHOEA RRNA SPEC QL NAA+PROBE: NEGATIVE
T VAGINALIS RRNA SPEC QL NAA+PROBE: NEGATIVE

## 2023-08-02 ENCOUNTER — NON-APPOINTMENT (OUTPATIENT)
Age: 33
End: 2023-08-02

## 2023-08-08 NOTE — COUNSELING
[Nutrition/ Exercise/ Weight Management] : nutrition, exercise, weight management [Vitamins/Supplements] : vitamins/supplements [Bladder Hygiene] : bladder hygiene [Contraception/ Emergency Contraception/ Safe Sexual Practices] : contraception, emergency contraception, safe sexual practices [STD (testing, results, tx)] : STD (testing, results, tx) [HPV Vaccine] : HPV Vaccine [Lab Results] : lab results [Medication Management] : medication management

## 2023-08-08 NOTE — PHYSICAL EXAM
[Chaperone Declined] : Patient declined chaperone [Appropriately responsive] : appropriately responsive [Alert] : alert [No Acute Distress] : no acute distress [Regular Rate Rhythm] : regular rate rhythm [Soft] : soft [Non-tender] : non-tender [Non-distended] : non-distended [No Lesions] : no lesions [No Mass] : no mass [Oriented x3] : oriented x3 [C.Acuminatum] : condyloma acuminatum [Labia Majora Erythema] : erythema [Labia Minora Erythema] : erythema [Normal] : normal [IUD String] : an IUD string was noted [Tenderness] : nontender [Enlarged ___ wks] : not enlarged [Mass ___ cm] : no uterine mass was palpated [Uterine Adnexae] : normal [FreeTextEntry2] : Mild erythema with superimposed Candida [FreeTextEntry4] : White clumpy discharge-no odor, genital culture done

## 2023-08-08 NOTE — HISTORY OF PRESENT ILLNESS
[Regular Cycle Intervals] : periods have been regular [Frequency: Q ___ days] : menstrual periods occur approximately every [unfilled] days [Menarche Age: ____] : age at menarche was [unfilled] [Currently Active] : currently active [Men] : men [No] : No [Yes] : Yes [Patient reported PAP Smear was normal] : Patient reported PAP Smear was normal [Gonorrhea test offered] : Gonorrhea test offered [Chlamydia test offered] : Chlamydia test offered [Trichomonas test offered] : Trichomonas test offered [IUD] : has an intrauterine device [Y] : Patient is sexually active [PapSmeardate] : 2/2023 [TextBox_4] : 32-year-old para 3-0-2-3 with LMP 6/25/2023 came for reevaluation of genital warts after completion of imiquimod management.  Patient states that many of the smaller warts resolved however she still has larger ones that she would like removed.  But she does complain currently of having UTI symptoms with dysuria low back pain and urinary frequency.  She also complains of discharge with itching and irritation.  She also has questions regarding the HPV Gardasil vaccine.  Patient was counseled that since she has not received the vaccine series even though she has genital warts would be recommended to protect herself against the other strains of the virus that are available in the vaccine.  Patient understood all counseling and all questions answered satisfactorily and she would like to start the series today.  UCG negative U dip: Microscopic hematuria, protein 100, moderate leukocytes [LMPDate] : 6/25/2023 [FreeTextEntry1] : 6/25/23 [FreeTextEntry3] : IUD

## 2023-08-08 NOTE — DISCUSSION/SUMMARY
[FreeTextEntry1] : A: 32-year-old with dysuria/UTI symptoms, vaginal discharge-vulvovaginal Candida,  BMI 26  P: GYN exam done Genital culture and urine culture sent Safe sex practices Pain and bleeding precautions Diflucan and clotrimazole/betamethasone Rx sent Bactrim Rx sent Contraception counseling done-patient would like to continue IUD use Healthy diet and exercise encouraged Gardasil HPV vaccine counseling done-patient given Gardasil No. 1 vaccine today Follow-up after all infections cleared and will excise remaining condyloma that are bothersome to patient Follow-up 2 months for Gardasil No. 2 or as needed

## 2023-08-16 RX ORDER — CLOTRIMAZOLE 10 MG/G
1 CREAM TOPICAL
Qty: 1 | Refills: 0 | Status: ACTIVE | COMMUNITY
Start: 2020-12-28 | End: 1900-01-01

## 2023-09-22 ENCOUNTER — APPOINTMENT (OUTPATIENT)
Dept: OBGYN | Facility: CLINIC | Age: 33
End: 2023-09-22
Payer: COMMERCIAL

## 2023-09-22 VITALS
SYSTOLIC BLOOD PRESSURE: 108 MMHG | WEIGHT: 141 LBS | TEMPERATURE: 98.6 F | DIASTOLIC BLOOD PRESSURE: 65 MMHG | BODY MASS INDEX: 26.62 KG/M2 | HEIGHT: 61 IN | HEART RATE: 74 BPM

## 2023-09-22 LAB
HCG UR QL: NEGATIVE
QUALITY CONTROL: YES

## 2023-09-22 PROCEDURE — 81025 URINE PREGNANCY TEST: CPT

## 2023-09-22 PROCEDURE — 90471 IMMUNIZATION ADMIN: CPT

## 2023-09-22 PROCEDURE — 56605 BIOPSY OF VULVA/PERINEUM: CPT

## 2023-09-22 PROCEDURE — 90651 9VHPV VACCINE 2/3 DOSE IM: CPT

## 2023-09-22 PROCEDURE — 99214 OFFICE O/P EST MOD 30 MIN: CPT | Mod: 25

## 2023-09-22 PROCEDURE — 56606 BIOPSY OF VULVA/PERINEUM: CPT

## 2023-09-26 LAB — CORE LAB BIOPSY: NORMAL

## 2023-11-06 ENCOUNTER — APPOINTMENT (OUTPATIENT)
Dept: OBGYN | Facility: CLINIC | Age: 33
End: 2023-11-06

## 2023-11-28 ENCOUNTER — APPOINTMENT (OUTPATIENT)
Dept: OBGYN | Facility: CLINIC | Age: 33
End: 2023-11-28
Payer: COMMERCIAL

## 2023-11-28 ENCOUNTER — ASOB RESULT (OUTPATIENT)
Age: 33
End: 2023-11-28

## 2023-11-28 VITALS
HEIGHT: 61 IN | HEART RATE: 59 BPM | DIASTOLIC BLOOD PRESSURE: 81 MMHG | BODY MASS INDEX: 27.94 KG/M2 | TEMPERATURE: 98.6 F | SYSTOLIC BLOOD PRESSURE: 119 MMHG | WEIGHT: 148 LBS

## 2023-11-28 DIAGNOSIS — R87.615 UNSATISFACTORY CYTOLOGIC SMEAR OF CERVIX: ICD-10-CM

## 2023-11-28 DIAGNOSIS — N94.10 UNSPECIFIED DYSPAREUNIA: ICD-10-CM

## 2023-11-28 DIAGNOSIS — Z86.19 PERSONAL HISTORY OF OTHER INFECTIOUS AND PARASITIC DISEASES: ICD-10-CM

## 2023-11-28 DIAGNOSIS — Z01.419 ENCOUNTER FOR GYNECOLOGICAL EXAMINATION (GENERAL) (ROUTINE) W/OUT ABNORMAL FINDINGS: ICD-10-CM

## 2023-11-28 DIAGNOSIS — T83.32XA DISPLACEMENT OF INTRAUTERINE CONTRACEPTIVE DEVICE, INITIAL ENCOUNTER: ICD-10-CM

## 2023-11-28 DIAGNOSIS — N93.0 POSTCOITAL AND CONTACT BLEEDING: ICD-10-CM

## 2023-11-28 DIAGNOSIS — L29.2 PRURITUS VULVAE: ICD-10-CM

## 2023-11-28 DIAGNOSIS — Z30.09 ENCOUNTER FOR OTHER GENERAL COUNSELING AND ADVICE ON CONTRACEPTION: ICD-10-CM

## 2023-11-28 DIAGNOSIS — Z12.4 ENCOUNTER FOR SCREENING FOR MALIGNANT NEOPLASM OF CERVIX: ICD-10-CM

## 2023-11-28 DIAGNOSIS — N90.89 OTHER SPECIFIED NONINFLAMMATORY DISORDERS OF VULVA AND PERINEUM: ICD-10-CM

## 2023-11-28 LAB
HCG UR QL: NEGATIVE
QUALITY CONTROL: YES

## 2023-11-28 PROCEDURE — 99395 PREV VISIT EST AGE 18-39: CPT

## 2023-11-28 PROCEDURE — 81025 URINE PREGNANCY TEST: CPT

## 2023-11-28 PROCEDURE — ZZZZZ: CPT

## 2023-11-28 PROCEDURE — 99213 OFFICE O/P EST LOW 20 MIN: CPT | Mod: 25

## 2023-11-28 PROCEDURE — 76830 TRANSVAGINAL US NON-OB: CPT

## 2023-11-30 LAB — HPV HIGH+LOW RISK DNA PNL CVX: NOT DETECTED

## 2023-12-04 LAB — CYTOLOGY CVX/VAG DOC THIN PREP: NORMAL

## 2023-12-19 ENCOUNTER — APPOINTMENT (OUTPATIENT)
Dept: OBGYN | Facility: CLINIC | Age: 33
End: 2023-12-19
Payer: COMMERCIAL

## 2023-12-19 VITALS
DIASTOLIC BLOOD PRESSURE: 80 MMHG | HEIGHT: 61 IN | TEMPERATURE: 98.6 F | SYSTOLIC BLOOD PRESSURE: 122 MMHG | BODY MASS INDEX: 27.75 KG/M2 | HEART RATE: 62 BPM | WEIGHT: 147 LBS

## 2023-12-19 DIAGNOSIS — Z30.430 ENCOUNTER FOR INSERTION OF INTRAUTERINE CONTRACEPTIVE DEVICE: ICD-10-CM

## 2023-12-19 DIAGNOSIS — Z30.09 ENCOUNTER FOR OTHER GENERAL COUNSELING AND ADVICE ON CONTRACEPTION: ICD-10-CM

## 2023-12-19 DIAGNOSIS — Z30.432 ENCOUNTER FOR REMOVAL OF INTRAUTERINE CONTRACEPTIVE DEVICE: ICD-10-CM

## 2023-12-19 LAB
HCG UR QL: NEGATIVE
QUALITY CONTROL: YES

## 2023-12-19 PROCEDURE — 99213 OFFICE O/P EST LOW 20 MIN: CPT | Mod: 25

## 2023-12-19 PROCEDURE — 58301 REMOVE INTRAUTERINE DEVICE: CPT

## 2023-12-19 PROCEDURE — 58300 INSERT INTRAUTERINE DEVICE: CPT

## 2023-12-19 PROCEDURE — 81025 URINE PREGNANCY TEST: CPT

## 2023-12-20 PROBLEM — Z30.430 ENCOUNTER FOR IUD INSERTION: Status: ACTIVE | Noted: 2023-12-20

## 2023-12-20 PROBLEM — Z30.09 GENERAL COUNSELING AND ADVICE FOR CONTRACEPTIVE MANAGEMENT: Status: ACTIVE | Noted: 2021-02-14

## 2023-12-20 NOTE — ASSESSMENT
[FreeTextEntry1] : Patient is 32y/o  F presenting for __Paragard__ IUD insertion  IUD NAME NDC: [address]-7 Lot: [id number] Exp: 7/2029   GC/CT cultures:                                                                                                                                                                                                                                                                              Urinepregnancy:   neg                                                                                                                                                                                                                        A time out was completed, verifying correct patient, procedure, site, positioning, and implant(s) or special equipment.                                                                                                                                                                                                 INDICATIONS FOR PROCEDURE: This patient is a 33 year female who presents for her IUD insertion.                                          The patient understands the risks and benefits of intrauterine device insertion and agrees to proceed.       PROCEDURE AND FINDINGS: After appropriate discussion of risks and benefits of IUD placement, written informed consent was obtained. Urine pregnancy test was negative. A bimanual exam revealed normal size and position of the uterus. The patient was placed in the dorsal lithotomy position, and a sterile speculum was inserted. The cervix was visualized and prepped with Iodine. A allis clamp was applied to the anterior lip of the cervix. A sound was placed through the cervical os in sterile fashion, and the uterus sounded to 8 cm. The IUD was loaded in the applicator in the usual fashion and the indicator placed according to the sound. The applicator was inserted into the cervix and the intrauterine device placed high in the endometrial cavity. The applicator was withdrawn and the strings trimmed. The patient tolerated the procedure well with no complications.                                                                                                                        FOLLOW UP: Patient was counseled regarding techniques for self-monitoring of IUD and given precautions. Patient notified of removal date.

## 2023-12-20 NOTE — PROCEDURE
[IUD Placement] : intrauterine device (IUD) placement [Neg Pregnancy Test] : negative pregnancy test [Betadine] : Betadine [ParaGard IUD] : ParaGard IUD [IUD Removal] : intrauterine device (IUD) removal [Consent Obtained] : Consent obtained [Risks] : risks [Benefits] : benefits [Alternatives] : alternatives [Prevention of Pregnancy] : prevention of pregnancy [Infection] : infection [Bleeding] : bleeding [Pain] : pain [Expulsion] : expulsion [Failure] : failure [Uterine Perforation] : uterine perforation [Easy Passage] : Easy passage [Sounded to ___ cm] : sounded to [unfilled] ~Ucm [None] : None [Time out performed] : Pre-procedure time out performed.  Patient's name, date of birth and procedure confirmed. [Patient] : patient [Speculum Placed] : speculum placed [IUD Removed - Forceps] : IUD removed - forceps [Cultured] : specimen sent for cultures [Tolerated Well] : Patient tolerated the procedure well [No Complications] : no complications [Heavy Vaginal Bleeding] : for heavy vaginal bleeding [Pelvic Pain] : for pelvic pain [LMPDate] : 12/15/23 [de-identified] : Ruperto Clamp  [de-identified] : [id number] [de-identified] : 7/2029 [de-identified] : malpositioned IUD

## 2023-12-21 PROBLEM — N94.10 DYSPAREUNIA IN FEMALE: Status: ACTIVE | Noted: 2023-07-21

## 2023-12-21 PROBLEM — Z30.09 GENERAL COUNSELING AND ADVICE FOR CONTRACEPTIVE MANAGEMENT: Status: ACTIVE | Noted: 2023-08-08

## 2023-12-21 PROBLEM — R87.615 ENCOUNTER FOR REPEAT PAPANICOLAOU SMEAR OF CERVIX DUE TO PREVIOUS UNSATISFACTORY RESULTS: Status: RESOLVED | Noted: 2023-02-10 | Resolved: 2023-12-21

## 2023-12-21 PROBLEM — L29.2 VULVAR PRURITUS: Status: ACTIVE | Noted: 2023-12-21

## 2023-12-21 PROBLEM — T83.32XA INTRAUTERINE DEVICE (IUD) MIGRATION, INITIAL ENCOUNTER: Status: ACTIVE | Noted: 2023-02-23

## 2023-12-21 PROBLEM — Z86.19 HISTORY OF CONDYLOMA ACUMINATUM: Status: RESOLVED | Noted: 2023-03-09 | Resolved: 2023-12-21

## 2023-12-21 PROBLEM — N93.0 POSTCOITAL BLEEDING: Status: ACTIVE | Noted: 2023-11-28

## 2023-12-21 PROBLEM — N90.89 VULVAR LESION: Status: RESOLVED | Noted: 2023-02-10 | Resolved: 2023-12-21

## 2023-12-21 PROBLEM — Z12.4 ENCOUNTER FOR SCREENING FOR CERVICAL CANCER: Status: ACTIVE | Noted: 2021-06-25

## 2023-12-21 RX ORDER — CLOBETASOL PROPIONATE 0.5 MG/G
0.05 OINTMENT TOPICAL TWICE DAILY
Qty: 1 | Refills: 0 | Status: ACTIVE | COMMUNITY
Start: 2023-12-21 | End: 1900-01-01

## 2023-12-21 RX ORDER — FLUCONAZOLE 150 MG/1
150 TABLET ORAL DAILY
Qty: 1 | Refills: 0 | Status: ACTIVE | COMMUNITY
Start: 2023-12-21 | End: 1900-01-01

## 2023-12-21 NOTE — DISCUSSION/SUMMARY
[FreeTextEntry1] : TVS done to assess recent bleeding with intercourse and dyspareunia.  Patient counseled that sono showed uterus 7.8x4.1x4.0cm.  The IUD was noted to be low in the endometrial cavity and possibly embedment of arms in myometrium. EMS 3.2mm.  Bother ovaries were WNL with corpus luteum noted on right ovary. There is no free fluid.  Patient counseled due to recent pain and bleeding and findings of IUD placement, would recommend removal. We discussed other options of contraception and the R/B/A. Patient states that she would like to remove current IUD and replace with new Paragard IUD.  Pain, bleeding and safe sex practices discussed until new IUD is inserted. Patient understood all counseling and all questions answered satisfactorily.  A: 32yo for annual GYN Exam, dyspareunia/post coital bleeding, IUD migrations, HSV, BMI 27  P: GYN Exam done     pap smear done     TVS done     safe sex practices     pain and bleeding precautions      contraception counseling done - will order new Paragard IUD and patient will come for removal and reinsertion     encouraged healthy diet and exercise      f/u for IUD mgmt or for routine exam or PRN

## 2023-12-21 NOTE — HISTORY OF PRESENT ILLNESS
[Gonorrhea test offered] : Gonorrhea test offered [Chlamydia test offered] : Chlamydia test offered [Trichomonas test offered] : Trichomonas test offered [HPV test offered] : HPV test offered [TextBox_4] : 33-year-old para 3-0-2-3 with LMP 11/12/2023 came for annual GYN exam and Pap smear. Patient denies abnormal uterine bleeding, pelvic pain, discharge, or dysuria. She gets intermittent vulvar pruritis but asx currently. She does state last weekend she had pain with intercourse and spotting after which hasn't happened previously.  She has a little pelvic pressure today. She has ParaGard IUD in situ for contraception. She does state that she gets occasional HSV outbreaks and would like refill of medication for PRN use. She denies current symptoms. She denies history of abnormal Pap, HPV or fibroids. She has a remote history of trichomonas that was treated in the last pregnancy. She does have history of ovarian cyst but is asymptomatic. She is sexually active with 1 male partner.   [PapSmeardate] : 2/2023 [LMPDate] : 11/12/23 [MensesFreq] : 28 [MensesLength] : 5 [MensesAmount] : heavy to light  [de-identified] : Paragard inserted 2/2021 [PGHxTotal] : 5 [[facility] 3 [PGHxPremature] : 0 [PGHxAbortions] : 2 [[facility]] : 3 [PGHxABInduced] : 2 [FreeTextEntry1] : 11/12/2023 [FreeTextEntry3] : IUD

## 2023-12-21 NOTE — HISTORY OF PRESENT ILLNESS
[Gonorrhea test offered] : Gonorrhea test offered [Chlamydia test offered] : Chlamydia test offered [Trichomonas test offered] : Trichomonas test offered [HPV test offered] : HPV test offered [TextBox_4] : 33-year-old para 3-0-2-3 with LMP 11/12/2023 came for annual GYN exam and Pap smear. Patient denies abnormal uterine bleeding, pelvic pain, discharge, or dysuria. She gets intermittent vulvar pruritis but asx currently. She does state last weekend she had pain with intercourse and spotting after which hasn't happened previously.  She has a little pelvic pressure today. She has ParaGard IUD in situ for contraception. She does state that she gets occasional HSV outbreaks and would like refill of medication for PRN use. She denies current symptoms. She denies history of abnormal Pap, HPV or fibroids. She has a remote history of trichomonas that was treated in the last pregnancy. She does have history of ovarian cyst but is asymptomatic. She is sexually active with 1 male partner.   [PapSmeardate] : 2/2023 [LMPDate] : 11/12/23 [MensesFreq] : 28 [MensesLength] : 5 [MensesAmount] : heavy to light  [de-identified] : Paragard inserted 2/2021 [PGHxTotal] : 5 [[facility]] : 3 [PGHxPremature] : 0 [PGHxAbortions] : 2 [[facility] Cliniciving] : 3 [PGHxABInduced] : 2 [FreeTextEntry1] : 11/12/2023 [FreeTextEntry3] : IUD

## 2023-12-21 NOTE — PHYSICAL EXAM
[Chaperone Declined] : Patient declined chaperone [Appropriately responsive] : appropriately responsive [Alert] : alert [No Acute Distress] : no acute distress [No Lymphadenopathy] : no lymphadenopathy [Regular Rate Rhythm] : regular rate rhythm [Soft] : soft [Non-tender] : non-tender [Non-distended] : non-distended [No Lesions] : no lesions [No Mass] : no mass [Oriented x3] : oriented x3 [Examination Of The Breasts] : a normal appearance [No Discharge] : no discharge [No Masses] : no breast masses were palpable [Labia Majora] : normal [Labia Minora] : normal [Normal] : normal [No Bleeding] : There was no active vaginal bleeding [IUD String] : an IUD string was noted [Uterine Adnexae] : normal [Tenderness] : nontender [Enlarged ___ wks] : not enlarged [Mass ___ cm] : no uterine mass was palpated [FreeTextEntry1] : no residual lesions or condyloma noted on exam today after previous excision/treatment [FreeTextEntry5] : pap smear done

## 2023-12-21 NOTE — DISCUSSION/SUMMARY
[FreeTextEntry1] : TVS done to assess recent bleeding with intercourse and dyspareunia.  Patient counseled that sono showed uterus 7.8x4.1x4.0cm.  The IUD was noted to be low in the endometrial cavity and possibly embedment of arms in myometrium. EMS 3.2mm.  Bother ovaries were WNL with corpus luteum noted on right ovary. There is no free fluid.  Patient counseled due to recent pain and bleeding and findings of IUD placement, would recommend removal. We discussed other options of contraception and the R/B/A. Patient states that she would like to remove current IUD and replace with new Paragard IUD.  Pain, bleeding and safe sex practices discussed until new IUD is inserted. Patient understood all counseling and all questions answered satisfactorily.  A: 34yo for annual GYN Exam, dyspareunia/post coital bleeding, IUD migrations, HSV, BMI 27  P: GYN Exam done     pap smear done     TVS done     safe sex practices     pain and bleeding precautions      contraception counseling done - will order new Paragard IUD and patient will come for removal and reinsertion     encouraged healthy diet and exercise      f/u for IUD mgmt or for routine exam or PRN

## 2023-12-26 LAB — ACTINOMYCES CULTURE FOR IUD: NORMAL

## 2024-01-12 ENCOUNTER — APPOINTMENT (OUTPATIENT)
Dept: OBGYN | Facility: CLINIC | Age: 34
End: 2024-01-12
Payer: COMMERCIAL

## 2024-01-12 VITALS
HEIGHT: 61 IN | TEMPERATURE: 98.6 F | HEART RATE: 87 BPM | BODY MASS INDEX: 27.19 KG/M2 | WEIGHT: 144 LBS | SYSTOLIC BLOOD PRESSURE: 113 MMHG | DIASTOLIC BLOOD PRESSURE: 72 MMHG

## 2024-01-12 DIAGNOSIS — E66.3 OVERWEIGHT: ICD-10-CM

## 2024-01-12 DIAGNOSIS — Z23 ENCOUNTER FOR IMMUNIZATION: ICD-10-CM

## 2024-01-12 DIAGNOSIS — Z71.89 OTHER SPECIFIED COUNSELING: ICD-10-CM

## 2024-01-12 DIAGNOSIS — Z71.85 ENCOUNTER FOR IMMUNIZATION SAFETY COUNSELING: ICD-10-CM

## 2024-01-12 DIAGNOSIS — Z97.5 PRESENCE OF (INTRAUTERINE) CONTRACEPTIVE DEVICE: ICD-10-CM

## 2024-01-12 LAB
HCG UR QL: NEGATIVE
QUALITY CONTROL: YES

## 2024-01-12 PROCEDURE — 90651 9VHPV VACCINE 2/3 DOSE IM: CPT

## 2024-01-12 PROCEDURE — 90471 IMMUNIZATION ADMIN: CPT

## 2024-01-12 PROCEDURE — 81025 URINE PREGNANCY TEST: CPT

## 2024-01-12 PROCEDURE — 99213 OFFICE O/P EST LOW 20 MIN: CPT | Mod: 25

## 2024-01-22 PROBLEM — Z71.85 HPV VACCINE COUNSELING: Status: ACTIVE | Noted: 2023-03-15

## 2024-01-22 PROBLEM — Z97.5 IUD (INTRAUTERINE DEVICE) IN PLACE: Status: ACTIVE | Noted: 2021-02-17

## 2024-01-22 PROBLEM — E66.3 OVERWEIGHT: Status: ACTIVE | Noted: 2021-02-14

## 2024-01-22 PROBLEM — Z23 ENCOUNTER FOR IMMUNIZATION: Status: ACTIVE | Noted: 2020-09-25

## 2024-01-22 PROBLEM — Z71.89 OTHER SPECIFIED COUNSELING: Status: ACTIVE | Noted: 2020-05-13

## 2024-01-22 NOTE — COUNSELING
[Nutrition/ Exercise/ Weight Management] : nutrition, exercise, weight management [Vitamins/Supplements] : vitamins/supplements [Bladder Hygiene] : bladder hygiene [Contraception/ Emergency Contraception/ Safe Sexual Practices] : contraception, emergency contraception, safe sexual practices [STD (testing, results, tx)] : STD (testing, results, tx) [HPV Vaccine] : HPV Vaccine [Medication Management] : medication management

## 2024-01-22 NOTE — HISTORY OF PRESENT ILLNESS
[Normal Amount/Duration] :  normal amount and duration [Regular Cycle Intervals] : periods have been regular [Frequency: Q ___ days] : menstrual periods occur approximately every [unfilled] days [Menarche Age: ____] : age at menarche was [unfilled] [Currently Active] : currently active [Men] : men [No] : No [Yes] : Yes [Patient reported PAP Smear was normal] : Patient reported PAP Smear was normal [IUD] : has an intrauterine device [TextBox_4] : 34yo  with LMP 12/15/2023 came for Gardasil # 3 vaccine, she denies adverse reaction to previous injections.  She denies AUB, pelvic pain, discharge, dysuria or other GYN complaints today.   ucg negative [Y] : Patient is sexually active [PapSmeardate] : 11/2023 [LMPDate] : 12/15/23 [FreeTextEntry1] : 12/15/2326 [FreeTextEntry3] : IUD

## 2024-01-22 NOTE — DISCUSSION/SUMMARY
[FreeTextEntry1] : A: 34yo for HPV Gardasil vaccine #3, IUD contraception, BMI 27  P: vaccine counseling done      gardasil #3 given      safe sex practices      pain and bleeding precautions      encourage healthy diet and exercise      f/u for routine exam or PRN

## 2024-07-19 DIAGNOSIS — N64.4 MASTODYNIA: ICD-10-CM

## 2024-08-16 ENCOUNTER — NON-APPOINTMENT (OUTPATIENT)
Age: 34
End: 2024-08-16

## 2024-08-20 DIAGNOSIS — N64.89 OTHER SPECIFIED DISORDERS OF BREAST: ICD-10-CM

## 2024-12-17 ENCOUNTER — APPOINTMENT (OUTPATIENT)
Dept: OBGYN | Facility: CLINIC | Age: 34
End: 2024-12-17

## 2024-12-17 ENCOUNTER — NON-APPOINTMENT (OUTPATIENT)
Age: 34
End: 2024-12-17

## 2024-12-17 VITALS
HEIGHT: 61 IN | TEMPERATURE: 98.6 F | BODY MASS INDEX: 26.62 KG/M2 | SYSTOLIC BLOOD PRESSURE: 116 MMHG | HEART RATE: 66 BPM | WEIGHT: 141 LBS | DIASTOLIC BLOOD PRESSURE: 76 MMHG

## 2024-12-17 DIAGNOSIS — Z12.4 ENCOUNTER FOR SCREENING FOR MALIGNANT NEOPLASM OF CERVIX: ICD-10-CM

## 2024-12-17 DIAGNOSIS — Z01.419 ENCOUNTER FOR GYNECOLOGICAL EXAMINATION (GENERAL) (ROUTINE) W/OUT ABNORMAL FINDINGS: ICD-10-CM

## 2024-12-17 PROCEDURE — 99395 PREV VISIT EST AGE 18-39: CPT

## 2024-12-19 RX ORDER — VALACYCLOVIR 500 MG/1
500 TABLET, FILM COATED ORAL
Qty: 14 | Refills: 5 | Status: ACTIVE | COMMUNITY
Start: 2024-12-19 | End: 1900-01-01

## 2024-12-19 RX ORDER — ACYCLOVIR 50 MG/G
5 OINTMENT TOPICAL
Qty: 30 | Refills: 1 | Status: ACTIVE | COMMUNITY
Start: 2024-12-19 | End: 1900-01-01

## 2024-12-28 LAB — CYTOLOGY CVX/VAG DOC THIN PREP: NORMAL

## 2025-04-18 PROBLEM — T83.32XA INTRAUTERINE DEVICE (IUD) MIGRATION, INITIAL ENCOUNTER: Status: RESOLVED | Noted: 2023-02-23 | Resolved: 2025-04-18

## 2025-04-18 PROBLEM — Z30.430 ENCOUNTER FOR IUD INSERTION: Status: RESOLVED | Noted: 2023-12-20 | Resolved: 2025-04-18

## 2025-04-18 PROBLEM — Z30.09 GENERAL COUNSELING AND ADVICE FOR CONTRACEPTIVE MANAGEMENT: Status: RESOLVED | Noted: 2021-02-14 | Resolved: 2025-04-18

## 2025-04-18 PROBLEM — Z30.432 ENCOUNTER FOR IUD REMOVAL: Status: RESOLVED | Noted: 2023-12-19 | Resolved: 2025-04-18

## 2025-05-19 DIAGNOSIS — R10.2 PELVIC AND PERINEAL PAIN: ICD-10-CM
